# Patient Record
Sex: FEMALE | Race: WHITE | Employment: PART TIME
[De-identification: names, ages, dates, MRNs, and addresses within clinical notes are randomized per-mention and may not be internally consistent; named-entity substitution may affect disease eponyms.]

---
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
## 2018-02-21 ENCOUNTER — OFFICE VISIT (OUTPATIENT)
Dept: FAMILY MEDICINE CLINIC | Age: 18
End: 2018-02-21
Payer: COMMERCIAL

## 2018-02-21 VITALS
DIASTOLIC BLOOD PRESSURE: 64 MMHG | HEIGHT: 67 IN | WEIGHT: 133 LBS | BODY MASS INDEX: 20.88 KG/M2 | SYSTOLIC BLOOD PRESSURE: 102 MMHG

## 2018-02-21 DIAGNOSIS — G43.109 MIGRAINE WITH AURA AND WITHOUT STATUS MIGRAINOSUS, NOT INTRACTABLE: ICD-10-CM

## 2018-02-21 DIAGNOSIS — N94.4 PRIMARY DYSMENORRHEA: Primary | ICD-10-CM

## 2018-02-21 PROCEDURE — 99213 OFFICE O/P EST LOW 20 MIN: CPT | Performed by: FAMILY MEDICINE

## 2018-02-21 RX ORDER — ACETAMINOPHEN AND CODEINE PHOSPHATE 120; 12 MG/5ML; MG/5ML
1 SOLUTION ORAL DAILY
Qty: 90 TABLET | Refills: 1 | Status: SHIPPED | OUTPATIENT
Start: 2018-02-21 | End: 2018-03-28 | Stop reason: SDUPTHER

## 2018-02-21 ASSESSMENT — PATIENT HEALTH QUESTIONNAIRE - PHQ9
SUM OF ALL RESPONSES TO PHQ QUESTIONS 1-9: 0
1. LITTLE INTEREST OR PLEASURE IN DOING THINGS: 0
SUM OF ALL RESPONSES TO PHQ9 QUESTIONS 1 & 2: 0
2. FEELING DOWN, DEPRESSED OR HOPELESS: 0

## 2018-02-21 ASSESSMENT — ENCOUNTER SYMPTOMS: GASTROINTESTINAL NEGATIVE: 1

## 2018-03-27 ENCOUNTER — TELEPHONE (OUTPATIENT)
Dept: FAMILY MEDICINE CLINIC | Age: 18
End: 2018-03-27

## 2018-03-27 DIAGNOSIS — N94.4 PRIMARY DYSMENORRHEA: ICD-10-CM

## 2018-03-28 RX ORDER — NORETHINDRONE 0.35 MG/1
1 TABLET ORAL DAILY
Qty: 90 TABLET | Refills: 3 | OUTPATIENT
Start: 2018-03-28 | End: 2019-01-21 | Stop reason: SDUPTHER

## 2018-07-20 ENCOUNTER — OFFICE VISIT (OUTPATIENT)
Dept: FAMILY MEDICINE CLINIC | Age: 18
End: 2018-07-20
Payer: COMMERCIAL

## 2018-07-20 VITALS
SYSTOLIC BLOOD PRESSURE: 110 MMHG | BODY MASS INDEX: 21.66 KG/M2 | HEART RATE: 60 BPM | HEIGHT: 67 IN | DIASTOLIC BLOOD PRESSURE: 60 MMHG | WEIGHT: 138 LBS

## 2018-07-20 DIAGNOSIS — N64.4 BREAST TENDERNESS IN FEMALE: ICD-10-CM

## 2018-07-20 DIAGNOSIS — N94.4 PRIMARY DYSMENORRHEA: Primary | ICD-10-CM

## 2018-07-20 DIAGNOSIS — G43.109 MIGRAINE WITH AURA AND WITHOUT STATUS MIGRAINOSUS, NOT INTRACTABLE: ICD-10-CM

## 2018-07-20 PROCEDURE — 81025 URINE PREGNANCY TEST: CPT | Performed by: FAMILY MEDICINE

## 2018-07-20 PROCEDURE — 99214 OFFICE O/P EST MOD 30 MIN: CPT | Performed by: FAMILY MEDICINE

## 2018-07-20 ASSESSMENT — ENCOUNTER SYMPTOMS: GASTROINTESTINAL NEGATIVE: 1

## 2018-07-20 NOTE — PROGRESS NOTES
11/6/14  Yes Robbin Cobian, DO   SUMAtriptan (IMITREX) 50 MG tablet Take 1 tablet by mouth once as needed for Migraine 12/16/16 1/16/17  John Gold DO        Allergies:       Patient has no known allergies. Review of Systems:     Positive and Negative as described in HPI    Review of Systems   Constitutional: Negative. Gastrointestinal: Negative. Genitourinary: Negative for difficulty urinating. Physical Exam:     Vitals:  /60   Pulse 60   Ht 5' 7\" (1.702 m)   Wt 138 lb (62.6 kg)   LMP 07/01/2018   BMI 21.61 kg/m²   Physical Exam   Constitutional: She is oriented to person, place, and time. She appears well-developed and well-nourished. No distress. HENT:   Head: Normocephalic and atraumatic. Eyes: Conjunctivae and EOM are normal.   Cardiovascular: Normal rate, regular rhythm and normal heart sounds. No peripheral edema. Pulmonary/Chest: Effort normal and breath sounds normal.   Abdominal: Soft. Bowel sounds are normal. There is no tenderness. Neurological: She is alert and oriented to person, place, and time. Skin: Skin is warm and dry. Psychiatric: She has a normal mood and affect. Judgment normal.   Nursing note and vitals reviewed.       Data:     Lab Results   Component Value Date     12/16/2016    K 3.9 12/16/2016     12/16/2016    CO2 26 12/16/2016    BUN 12 12/16/2016    CREATININE 0.64 12/16/2016    GLUCOSE 97 12/16/2016    PROT 7.8 11/01/2013    LABALBU 4.4 11/01/2013    BILITOT 0.60 11/01/2013    ALKPHOS 115 11/01/2013    AST 22 11/01/2013    ALT 17 11/01/2013     Lab Results   Component Value Date    WBC 5.9 12/16/2016    RBC 4.81 12/16/2016    HGB 14.4 12/16/2016    HCT 42.4 12/16/2016    MCV 88.3 12/16/2016    MCH 30.0 12/16/2016    MCHC 33.9 12/16/2016    RDW 12.4 12/16/2016     12/16/2016    MPV NOT REPORTED 12/16/2016     Lab Results   Component Value Date    TSH 1.61 11/01/2013     Lab Results   Component Value Date    LABA1C 4.8

## 2018-07-22 PROBLEM — N94.4 PRIMARY DYSMENORRHEA: Status: ACTIVE | Noted: 2018-07-22

## 2018-07-22 PROBLEM — G43.109 MIGRAINE WITH AURA AND WITHOUT STATUS MIGRAINOSUS, NOT INTRACTABLE: Status: ACTIVE | Noted: 2018-07-22

## 2018-08-22 ENCOUNTER — OFFICE VISIT (OUTPATIENT)
Dept: PRIMARY CARE CLINIC | Age: 18
End: 2018-08-22
Payer: COMMERCIAL

## 2018-08-22 VITALS
BODY MASS INDEX: 20.99 KG/M2 | SYSTOLIC BLOOD PRESSURE: 122 MMHG | DIASTOLIC BLOOD PRESSURE: 75 MMHG | HEART RATE: 60 BPM | TEMPERATURE: 98.1 F | OXYGEN SATURATION: 99 % | WEIGHT: 134 LBS

## 2018-08-22 DIAGNOSIS — R10.9 FLANK PAIN: ICD-10-CM

## 2018-08-22 DIAGNOSIS — R35.0 URINARY FREQUENCY: ICD-10-CM

## 2018-08-22 DIAGNOSIS — N30.01 ACUTE CYSTITIS WITH HEMATURIA: Primary | ICD-10-CM

## 2018-08-22 LAB
BILIRUBIN, POC: ABNORMAL
BLOOD URINE, POC: ABNORMAL
CLARITY, POC: CLEAR
COLOR, POC: YELLOW
CONTROL: PRESENT
GLUCOSE URINE, POC: ABNORMAL
KETONES, POC: ABNORMAL
LEUKOCYTE EST, POC: ABNORMAL
NITRITE, POC: ABNORMAL
PH, POC: 5.5
PREGNANCY TEST URINE, POC: NEGATIVE
PROTEIN, POC: ABNORMAL
SPECIFIC GRAVITY, POC: 1.02
UROBILINOGEN, POC: 0.2

## 2018-08-22 PROCEDURE — 81025 URINE PREGNANCY TEST: CPT | Performed by: NURSE PRACTITIONER

## 2018-08-22 PROCEDURE — 99213 OFFICE O/P EST LOW 20 MIN: CPT | Performed by: NURSE PRACTITIONER

## 2018-08-22 PROCEDURE — 81003 URINALYSIS AUTO W/O SCOPE: CPT | Performed by: NURSE PRACTITIONER

## 2018-08-22 RX ORDER — NITROFURANTOIN 25; 75 MG/1; MG/1
100 CAPSULE ORAL 2 TIMES DAILY
Qty: 14 CAPSULE | Refills: 0 | Status: SHIPPED | OUTPATIENT
Start: 2018-08-22 | End: 2018-08-29

## 2018-08-22 ASSESSMENT — ENCOUNTER SYMPTOMS
WHEEZING: 0
COUGH: 0
NAUSEA: 0
SHORTNESS OF BREATH: 0
VOMITING: 0
DIARRHEA: 0
CONSTIPATION: 0

## 2018-08-22 NOTE — PATIENT INSTRUCTIONS
SURVEY:    You may be receiving a survey from [employer] regarding your visit today. Please complete the survey to enable us to provide the highest quality of care to you and your family. If you cannot score us as very good on any question, please call the office to discuss how we could have made your experience exceptional.     Thank you. Patient Education   Patient Education          nitrofurantoin  Pronunciation:  MAGALI BEAUCHAMP toin  Brand:  Furadantin, Macrobid, Macrodantin  What is the most important information I should know about nitrofurantoin? You should not take nitrofurantoin if you have severe kidney disease, urination problems, or a history of jaundice or liver problems caused by nitrofurantoin. Do not take nitrofurantoin if you are in the last 2 to 4 weeks of pregnancy. What is nitrofurantoin? Nitrofurantoin is an antibiotic that fights bacteria in the body. Nitrofurantoin is used to treat urinary tract infections. Nitrofurantoin may also be used for purposes not listed in this medication guide. What should I discuss with my healthcare provider before taking nitrofurantoin? You should not take nitrofurantoin if you are allergic to it, or if you have:  · severe kidney disease;  · a history of jaundice or liver problems caused by taking nitrofurantoin;  · if you are urinating less than usual or not at all; or  · if you are in the last 2 to 4 weeks of pregnancy. Do not take nitrofurantoin if you are in the last 2 to 4 weeks of pregnancy. To make sure nitrofurantoin is safe for you, tell your doctor if you have:  · kidney disease;  · anemia;  · diabetes;  · an electrolyte imbalance or vitamin B deficiency;  · glucose-6-phosphate dehydrogenase (G6PD) deficiency; or  · any type of debilitating disease. FDA pregnancy category B. This medicine is not expected to be harmful to an unborn baby during early pregnancy.  Tell your doctor if you are pregnant or plan to become pregnant during treatment. Nitrofurantoin can pass into breast milk and may harm a nursing baby. You should not breast-feed while you are taking nitrofurantoin. Nitrofurantoin should not be given to a child younger than 2 month old. How should I take nitrofurantoin? Follow all directions on your prescription label. Do not take this medicine in larger or smaller amounts or for longer than recommended. Take nitrofurantoin with food. Shake the oral suspension (liquid) well just before you measure a dose. Measure liquid medicine with the dosing syringe provided, or with a special dose-measuring spoon or medicine cup. If you do not have a dose-measuring device, ask your pharmacist for one. You may mix your liquid dose with water, milk, or fruit juice to make it easier to swallow. Drink the entire mixture right away. Use this medicine for the full prescribed length of time. Your symptoms may improve before the infection is completely cleared. Skipping doses may also increase your risk of further infection that is resistant to antibiotics. Nitrofurantoin will not treat a viral infection such as the common cold or flu. Nitrofurantoin is usually given for up to 3 days after lab tests show that the infection has cleared. If you use this medicine long-term, you may need frequent medical tests at your doctor's office. Nitrofurantoin can cause unusual results with certain lab tests for glucose (sugar) in the urine. Tell any doctor who treats you that you are using nitrofurantoin. Store at room temperature away from moisture, heat, and light. What happens if I miss a dose? Take the missed dose as soon as you remember. Skip the missed dose if it is almost time for your next scheduled dose. Do not  take extra medicine to make up the missed dose. What happens if I overdose? Seek emergency medical attention or call the Poison Help line at 1-919.931.4091. What should I avoid while taking nitrofurantoin?   Antibiotic medicines can using.  Where can I get more information? Your pharmacist can provide more information about nitrofurantoin. Remember, keep this and all other medicines out of the reach of children, never share your medicines with others, and use this medication only for the indication prescribed. Every effort has been made to ensure that the information provided by [address] is accurate, up-to-date, and complete, but no guarantee is made to that effect. Drug information contained herein may be time sensitive. [facility] information has been compiled for use by healthcare practitioners and consumers in the [place] and therefore [facility] does not warrant that uses outside of the [place] are appropriate, unless specifically indicated otherwise. [facility]'s drug information does not endorse drugs, diagnose patients or recommend therapy. [facility]'s drug information is an informational resource designed to assist licensed healthcare practitioners in caring for their patients and/or to serve consumers viewing this service as a supplement to, and not a substitute for, the expertise, skill, knowledge and judgment of healthcare practitioners. The absence of a warning for a given drug or drug combination in no way should be construed to indicate that the drug or drug combination is safe, effective or appropriate for any given patient. [facility] does not assume any responsibility for any aspect of healthcare administered with the aid of information [facility] provides. The information contained herein is not intended to cover all possible uses, directions, precautions, warnings, drug interactions, allergic reactions, or adverse effects. If you have questions about the drugs you are taking, check with your doctor, nurse or pharmacist.  Copyright 8563-4858 [address]. Version: 8.01. Revision date: 3/11/2014. Care instructions adapted under license by [facility] ([facility]).  If you have questions about a medical condition or this instruction, always ask your healthcare professional. [place] [address] any warranty or liability for your use of this information. Urinary Tract Infection in Women: Care Instructions  Your Care Instructions    A urinary tract infection, or UTI, is a general term for an infection anywhere between the kidneys and the urethra (where urine comes out). Most UTIs are bladder infections. They often cause pain or burning when you urinate. UTIs are caused by bacteria and can be cured with antibiotics. Be sure to complete your treatment so that the infection goes away. Follow-up care is a key part of your treatment and safety. Be sure to make and go to all appointments, and call your doctor if you are having problems. It's also a good idea to know your test results and keep a list of the medicines you take. How can you care for yourself at home? · Take your antibiotics as directed. Do not stop taking them just because you feel better. You need to take the full course of antibiotics. · Drink extra water and other fluids for the next day or two. This may help wash out the bacteria that are causing the infection. (If you have kidney, heart, or liver disease and have to limit fluids, talk with your doctor before you increase your fluid intake.)  · Avoid drinks that are carbonated or have caffeine. They can irritate the bladder. · Urinate often. Try to empty your bladder each time. · To relieve pain, take a hot bath or lay a heating pad set on low over your lower belly or genital area. Never go to sleep with a heating pad in place. To prevent UTIs  · Drink plenty of water each day. This helps you urinate often, which clears bacteria from your system. (If you have kidney, heart, or liver disease and have to limit fluids, talk with your doctor before you increase your fluid intake.)  · Urinate when you need to. · Urinate right after you have sex. · Change sanitary pads often.   · Avoid hives, rash, facial/tongue swelling or temp greater than 103 degrees. · Follow up with PCP or [facility] if symptoms worsen or do not improve.

## 2018-08-22 NOTE — PROGRESS NOTES
[address] [facility] WALK-IN [facility] [address] [id number]  Dept: [phone]  Dept Fax: [phone]    Petra Coronel is a 19 y.o. female who presents to the [facility] in Care today for her medical conditions/complaints as noted below. Petra Coronel is c/o of Urinary Tract Infection (Patient presents today for possible UTI since Sunday. Patient states she has had increased urinary frequency, EDGAR flank pain but denies dysuria. )      HPI:     Urinary Tract Infection    This is a new problem. The current episode started in the past 7 days (Started on Sunday with urinary frequency and B/L flank pain. ). The problem occurs every urination. The problem has been gradually worsening. The quality of the pain is described as stabbing (suprapubic area with urination). The pain is at a severity of 10/10. The pain is severe. There has been no fever. She is sexually active. There is no history of pyelonephritis. Associated symptoms include flank pain, frequency, hematuria (notice small blood clots when urinate) and urgency. Pertinent negatives include no chills, nausea or vomiting. She has tried nothing for the symptoms. The treatment provided no relief. There is no history of catheterization, kidney stones, recurrent UTIs, a single kidney, urinary stasis or a urological procedure.        Past Medical History:   Diagnosis Date    Allergic         Current Outpatient Prescriptions   Medication Sig Dispense Refill    nitrofurantoin, macrocrystal-monohydrate, (MACROBID) 100 MG capsule Take 1 capsule by mouth 2 times daily for 7 days 14 capsule 0    JOLIVETTE 0.35 MG tablet Take 1 tablet by mouth daily 90 tablet 3    fluticasone (FLONASE) 50 MCG/ACT nasal spray 2 sprays by Nasal route daily 1 Bottle 1    naproxen (EC-NAPROSYN) 500 MG EC tablet 1 po at onset of headaches BID prn 30 tablet 0    acetaminophen (TYLENOL) 325 MG tablet Take 650 mg by mouth every 6 hours as needed for of motion. Lymphadenopathy:     She has no cervical adenopathy. Right cervical: No superficial cervical and no posterior cervical adenopathy present. Left cervical: No superficial cervical and no posterior cervical adenopathy present. Neurological: She is alert and oriented to person, place, and time. Skin: Skin is warm and dry. No rash noted. She is not diaphoretic. No erythema. No pallor. Psychiatric: She has a normal mood and affect. Her behavior is normal.   Nursing note and vitals reviewed. /75   Pulse 60   Temp 98.1 °F (36.7 °C)   Wt 134 lb (60.8 kg)   LMP 08/15/2018   SpO2 99%   BMI 20.99 kg/m²      POCT urine: Moderate blood, small leukocytes and negative nitrites. POCT pregnancy:  Negative    Assessment:      Diagnosis Orders   1. Acute cystitis with hematuria  nitrofurantoin, macrocrystal-monohydrate, (MACROBID) 100 MG capsule   2. Flank pain  POCT Urinalysis No Micro (Auto)    POCT urine pregnancy   3. Urinary frequency  POCT Urinalysis No Micro (Auto)    POCT urine pregnancy       Plan:      Return if symptoms worsen or fail to improve, for Resume all previous medications as directed. Orders Placed This Encounter   Medications    nitrofurantoin, macrocrystal-monohydrate, (MACROBID) 100 MG capsule     Sig: Take 1 capsule by mouth 2 times daily for 7 days     Dispense:  14 capsule     Refill:  0      · Encouraged to increase fluids and to eat nutritiously. · Avoid full bladder, bubble baths, bath oils and hot tubs. · Wipe front to back and void after sexual intercourse. · Continue antibiotic as prescribed until all doses are completed  · Probiotic OTC or greek yogurt daily while on antibiotic  · After antibiotic is completed, have urine rechecked for blood. · Patient instructions given for urinary tract infection and macrobid.   · To ER or call 911 if any difficulty breathing, shortness of breath, inability to swallow, hives, rash, facial/tongue swelling or temp

## 2018-09-07 ENCOUNTER — OFFICE VISIT (OUTPATIENT)
Dept: PRIMARY CARE CLINIC | Age: 18
End: 2018-09-07
Payer: COMMERCIAL

## 2018-09-07 VITALS
WEIGHT: 137.1 LBS | BODY MASS INDEX: 21.52 KG/M2 | OXYGEN SATURATION: 99 % | HEART RATE: 71 BPM | HEIGHT: 67 IN | SYSTOLIC BLOOD PRESSURE: 122 MMHG | DIASTOLIC BLOOD PRESSURE: 79 MMHG

## 2018-09-07 DIAGNOSIS — R31.9 HEMATURIA, UNSPECIFIED TYPE: Primary | ICD-10-CM

## 2018-09-07 LAB
BILIRUBIN, POC: NEGATIVE
BLOOD URINE, POC: NEGATIVE
CLARITY, POC: CLEAR
COLOR, POC: NORMAL
GLUCOSE URINE, POC: NEGATIVE
KETONES, POC: NORMAL
LEUKOCYTE EST, POC: NEGATIVE
NITRITE, POC: NEGATIVE
PH, POC: 6
PROTEIN, POC: 30
SPECIFIC GRAVITY, POC: 1.02
UROBILINOGEN, POC: 0.2

## 2018-09-07 PROCEDURE — 81002 URINALYSIS NONAUTO W/O SCOPE: CPT | Performed by: NURSE PRACTITIONER

## 2018-09-07 PROCEDURE — 99211 OFF/OP EST MAY X REQ PHY/QHP: CPT | Performed by: NURSE PRACTITIONER

## 2018-09-21 ENCOUNTER — OFFICE VISIT (OUTPATIENT)
Dept: FAMILY MEDICINE CLINIC | Age: 18
End: 2018-09-21
Payer: COMMERCIAL

## 2018-09-21 VITALS — HEIGHT: 67 IN | WEIGHT: 135 LBS | BODY MASS INDEX: 21.19 KG/M2

## 2018-09-21 DIAGNOSIS — R21 RASH OF HANDS: Primary | ICD-10-CM

## 2018-09-21 PROCEDURE — 99213 OFFICE O/P EST LOW 20 MIN: CPT | Performed by: FAMILY MEDICINE

## 2018-09-21 RX ORDER — ALBUTEROL SULFATE 90 UG/1
2 AEROSOL, METERED RESPIRATORY (INHALATION) EVERY 6 HOURS PRN
Qty: 1 INHALER | Refills: 2 | Status: SHIPPED | OUTPATIENT
Start: 2018-09-21 | End: 2020-09-30

## 2018-09-21 ASSESSMENT — ENCOUNTER SYMPTOMS: RESPIRATORY NEGATIVE: 1

## 2018-09-21 NOTE — PATIENT INSTRUCTIONS
SURVEY:    You may be receiving a survey from [employer] regarding your visit today. Please complete the survey to enable us to provide the highest quality of care to you and your family. If you cannot score us as very good on any question, please call the office to discuss how we could have made your experience exceptional.     Thank you.

## 2019-01-21 ENCOUNTER — OFFICE VISIT (OUTPATIENT)
Dept: FAMILY MEDICINE CLINIC | Age: 19
End: 2019-01-21
Payer: COMMERCIAL

## 2019-01-21 VITALS
HEIGHT: 67 IN | HEART RATE: 75 BPM | SYSTOLIC BLOOD PRESSURE: 106 MMHG | DIASTOLIC BLOOD PRESSURE: 60 MMHG | WEIGHT: 145 LBS | BODY MASS INDEX: 22.76 KG/M2

## 2019-01-21 DIAGNOSIS — N94.4 PRIMARY DYSMENORRHEA: Primary | ICD-10-CM

## 2019-01-21 PROCEDURE — 99213 OFFICE O/P EST LOW 20 MIN: CPT | Performed by: FAMILY MEDICINE

## 2019-01-21 RX ORDER — NORETHINDRONE 0.35 MG/1
1 TABLET ORAL DAILY
Qty: 90 TABLET | Refills: 0 | Status: SHIPPED | OUTPATIENT
Start: 2019-01-21 | End: 2019-03-26 | Stop reason: SDUPTHER

## 2019-01-21 ASSESSMENT — PATIENT HEALTH QUESTIONNAIRE - PHQ9
2. FEELING DOWN, DEPRESSED OR HOPELESS: 0
1. LITTLE INTEREST OR PLEASURE IN DOING THINGS: 0
SUM OF ALL RESPONSES TO PHQ QUESTIONS 1-9: 0
SUM OF ALL RESPONSES TO PHQ QUESTIONS 1-9: 0
SUM OF ALL RESPONSES TO PHQ9 QUESTIONS 1 & 2: 0

## 2019-01-21 ASSESSMENT — ENCOUNTER SYMPTOMS: GASTROINTESTINAL NEGATIVE: 1

## 2019-03-26 DIAGNOSIS — N94.4 PRIMARY DYSMENORRHEA: ICD-10-CM

## 2019-03-26 RX ORDER — NORETHINDRONE 0.35 MG/1
1 TABLET ORAL DAILY
Qty: 84 TABLET | Refills: 3 | Status: SHIPPED | OUTPATIENT
Start: 2019-03-26 | End: 2019-05-21 | Stop reason: SDUPTHER

## 2019-05-21 DIAGNOSIS — N94.4 PRIMARY DYSMENORRHEA: ICD-10-CM

## 2019-05-21 RX ORDER — NORETHINDRONE 0.35 MG/1
1 TABLET ORAL DAILY
Qty: 84 TABLET | Refills: 3 | Status: SHIPPED | OUTPATIENT
Start: 2019-05-21 | End: 2019-11-21 | Stop reason: SDUPTHER

## 2019-06-06 ENCOUNTER — HOSPITAL ENCOUNTER (OUTPATIENT)
Age: 19
Discharge: HOME | End: 2019-06-06
Payer: COMMERCIAL

## 2019-06-06 ENCOUNTER — OFFICE VISIT (OUTPATIENT)
Dept: FAMILY MEDICINE CLINIC | Age: 19
End: 2019-06-06
Payer: COMMERCIAL

## 2019-06-06 VITALS
BODY MASS INDEX: 22.44 KG/M2 | HEART RATE: 71 BPM | SYSTOLIC BLOOD PRESSURE: 100 MMHG | OXYGEN SATURATION: 99 % | HEIGHT: 67 IN | WEIGHT: 143 LBS | DIASTOLIC BLOOD PRESSURE: 60 MMHG

## 2019-06-06 DIAGNOSIS — Z00.00 ROUTINE HEALTH MAINTENANCE: Primary | ICD-10-CM

## 2019-06-06 DIAGNOSIS — Z11.1 PPD SCREENING TEST: ICD-10-CM

## 2019-06-06 DIAGNOSIS — Z01.84 ANTIBODY RESPONSE EXAM: ICD-10-CM

## 2019-06-06 PROCEDURE — 99395 PREV VISIT EST AGE 18-39: CPT | Performed by: FAMILY MEDICINE

## 2019-06-06 PROCEDURE — 86580 TB INTRADERMAL TEST: CPT | Performed by: FAMILY MEDICINE

## 2019-06-06 PROCEDURE — 36415 COLL VENOUS BLD VENIPUNCTURE: CPT

## 2019-06-06 PROCEDURE — 86787 VARICELLA-ZOSTER ANTIBODY: CPT

## 2019-06-06 ASSESSMENT — PATIENT HEALTH QUESTIONNAIRE - PHQ9
SUM OF ALL RESPONSES TO PHQ QUESTIONS 1-9: 0
SUM OF ALL RESPONSES TO PHQ9 QUESTIONS 1 & 2: 0
SUM OF ALL RESPONSES TO PHQ QUESTIONS 1-9: 0
1. LITTLE INTEREST OR PLEASURE IN DOING THINGS: 0
2. FEELING DOWN, DEPRESSED OR HOPELESS: 0

## 2019-06-06 ASSESSMENT — ENCOUNTER SYMPTOMS
RESPIRATORY NEGATIVE: 1
GASTROINTESTINAL NEGATIVE: 1
EYES NEGATIVE: 1

## 2019-06-06 NOTE — PROGRESS NOTES
Name: Stan Brooks  : 2000         Chief Complaint:     Chief Complaint   Patient presents with    Annual Exam       History of Present Illness:      Stan Brooks is a 22 y.o.  female who presents with Annual Exam      HPI     Patient presents for routine health maintenance. Has no complaints except for some allergic rhinitis symptoms recently. 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 Continuing to work at [employer] for the summer. Past Medical History:     Past Medical History:   Diagnosis Date    Allergic         Past Surgical History:     No past surgical history on file. Medications:       Prior to Admission medications    Medication Sig Start Date End Date Taking? Authorizing Provider   JOLIVETTE 0.35 MG tablet Take 1 tablet by mouth daily 19  Yes Annamary Many, DO   albuterol sulfate HFA (VENTOLIN HFA) 108 (90 Base) MCG/ACT inhaler Inhale 2 puffs into the lungs every 6 hours as needed for Wheezing or Shortness of Breath 18  Yes Annamary Many, DO   acetaminophen (TYLENOL) 325 MG tablet Take 650 mg by mouth every 6 hours as needed for Pain   Yes Historical Provider, MD        Allergies:       Patient has no known allergies. Social History:     Tobacco:    reports that she has never smoked. She has never used smokeless tobacco.  Alcohol:      reports that she does not drink alcohol. Drug Use:  reports that she does not use drugs. Family History:     No family history on file. Review of Systems:     Positive and Negative as described in HPI    Review of Systems   Constitutional: Negative. HENT: Negative. Eyes: Negative. Respiratory: Negative. Cardiovascular: Negative. Gastrointestinal: Negative. Genitourinary: Negative. Musculoskeletal: Negative. Skin: Negative. Neurological: Negative. Hematological: Negative. Psychiatric/Behavioral: Negative.         Physical Exam: Vitals:  /60   Pulse 71   Ht 5' 7\" (1.702 m)   Wt 143 lb (64.9 kg)   SpO2 99%   BMI 22.40 kg/m²   Physical Exam   Constitutional: She is oriented to person, place, and time. She appears well-developed and well-nourished. HENT:   Head: Normocephalic and atraumatic. Right Ear: Hearing and tympanic membrane normal.   Left Ear: Hearing and tympanic membrane normal.   Nose: No mucosal edema or rhinorrhea. Mouth/Throat: Oropharynx is clear and moist.   Eyes: Pupils are equal, round, and reactive to light. Conjunctivae and EOM are normal.   Neck: Neck supple. No thyroid mass and no thyromegaly present. Cardiovascular: Normal rate, regular rhythm, S1 normal and S2 normal.   No murmur heard. No peripheral edema. Pulmonary/Chest: Effort normal and breath sounds normal.   Abdominal: Soft. Bowel sounds are normal. There is no hepatosplenomegaly. There is no tenderness. Musculoskeletal:   Normal tone and bulk, normal gait. Lymphadenopathy:     She has no cervical adenopathy. Neurological: She is alert and oriented to person, place, and time. She has normal strength. Skin: Skin is warm and dry. No rash noted. Psychiatric: She has a normal mood and affect. Her behavior is normal.   Nursing note and vitals reviewed.       Data:     Lab Results   Component Value Date     12/16/2016    K 3.9 12/16/2016     12/16/2016    CO2 26 12/16/2016    BUN 12 12/16/2016    CREATININE 0.64 12/16/2016    GLUCOSE 97 12/16/2016    PROT 7.8 11/01/2013    LABALBU 4.4 11/01/2013    BILITOT 0.60 11/01/2013    ALKPHOS 115 11/01/2013    AST 22 11/01/2013    ALT 17 11/01/2013     Lab Results   Component Value Date    WBC 5.9 12/16/2016    RBC 4.81 12/16/2016    HGB 14.4 12/16/2016    HCT 42.4 12/16/2016    MCV 88.3 12/16/2016    MCH 30.0 12/16/2016    MCHC 33.9 12/16/2016    RDW 12.4 12/16/2016     12/16/2016    MPV NOT REPORTED 12/16/2016     Lab Results   Component Value Date    TSH 1.61 11/01/2013 Lab Results   Component Value Date    LABA1C 4.8 12/16/2016         Assessment & Plan:        Diagnosis Orders   1. Routine health maintenance     2. PPD screening test  Mantoux testing   3. Antibody response exam  Varicella Zoster Antibody, IgG   history and exam within normal limits. Can have her first Pap at any time now. 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        Requested Prescriptions      No prescriptions requested or ordered in this encounter       There are no Patient Instructions on file for this visit. [facility] received counseling on the following healthy behaviors: medication adherence  Reviewed prior labs and health maintenance. Continue current medications, diet and exercise. Discussed use, benefit, and side effects of prescribed medications. Barriers to medication compliance addressed. Patient given educational materials - see patient instructions. All patient questions answered. Patient voiced understanding.      Electronically signed by Augustin Ahn DO on 6/6/2019 at 11:28 AM   [address] [address]  Dept: [phone]

## 2019-06-07 LAB — VZV IGG SER QL IA: 1.24

## 2019-06-08 LAB
INDURATION: NORMAL
TB SKIN TEST: NORMAL

## 2019-06-24 ENCOUNTER — NURSE ONLY (OUTPATIENT)
Dept: FAMILY MEDICINE CLINIC | Age: 19
End: 2019-06-24
Payer: COMMERCIAL

## 2019-06-24 DIAGNOSIS — Z11.1 PPD SCREENING TEST: Primary | ICD-10-CM

## 2019-06-24 PROCEDURE — 86580 TB INTRADERMAL TEST: CPT | Performed by: FAMILY MEDICINE

## 2019-06-24 NOTE — PROGRESS NOTES
After obtaining consent, and per orders of Dr. Nhan Eng, injection of mantoux given in Right arm by Bertrum Bath. Patient instructed to remain in clinic for 20 minutes afterwards, and to report any adverse reaction to me immediately.

## 2019-06-26 ENCOUNTER — NURSE ONLY (OUTPATIENT)
Dept: FAMILY MEDICINE CLINIC | Age: 19
End: 2019-06-26

## 2019-06-26 DIAGNOSIS — Z11.1 ENCOUNTER FOR PPD SKIN TEST READING: Primary | ICD-10-CM

## 2019-06-26 LAB
INDURATION: NORMAL
TB SKIN TEST: NEGATIVE

## 2019-06-26 NOTE — PROGRESS NOTES
PPD Reading Note  PPD read and results entered in [address]. Result: 0 mm induration.   Interpretation: negative  If test not read within 48-72 hours of initial placement, patient advised to repeat in other arm 1-3 weeks after this test.  Allergic reaction: no

## 2019-07-15 ENCOUNTER — HOSPITAL ENCOUNTER (OUTPATIENT)
Age: 19
Setting detail: SPECIMEN
Discharge: HOME | End: 2019-07-15
Payer: COMMERCIAL

## 2019-07-15 ENCOUNTER — OFFICE VISIT (OUTPATIENT)
Dept: FAMILY MEDICINE CLINIC | Age: 19
End: 2019-07-15
Payer: COMMERCIAL

## 2019-07-15 VITALS
BODY MASS INDEX: 22.91 KG/M2 | DIASTOLIC BLOOD PRESSURE: 80 MMHG | SYSTOLIC BLOOD PRESSURE: 110 MMHG | WEIGHT: 146 LBS | HEIGHT: 67 IN

## 2019-07-15 DIAGNOSIS — Z12.4 CERVICAL CANCER SCREENING: ICD-10-CM

## 2019-07-15 DIAGNOSIS — N89.8 VAGINAL DISCHARGE: ICD-10-CM

## 2019-07-15 DIAGNOSIS — Z01.419 WELL WOMAN EXAM WITH ROUTINE GYNECOLOGICAL EXAM: Primary | ICD-10-CM

## 2019-07-15 PROCEDURE — G0145 SCR C/V CYTO,THINLAYER,RESCR: HCPCS

## 2019-07-15 PROCEDURE — 87491 CHLMYD TRACH DNA AMP PROBE: CPT

## 2019-07-15 PROCEDURE — 87591 N.GONORRHOEAE DNA AMP PROB: CPT

## 2019-07-15 PROCEDURE — 99395 PREV VISIT EST AGE 18-39: CPT | Performed by: FAMILY MEDICINE

## 2019-07-15 ASSESSMENT — ENCOUNTER SYMPTOMS
RESPIRATORY NEGATIVE: 1
GASTROINTESTINAL NEGATIVE: 1

## 2019-07-15 NOTE — PROGRESS NOTES
5' 7\" (1.702 m)   Wt 146 lb (66.2 kg)   LMP 07/04/2019   BMI 22.87 kg/m²   Physical Exam   Constitutional: She is oriented to person, place, and time. She appears well-developed and well-nourished. No distress. Pulmonary/Chest: Effort normal. Right breast exhibits no mass and no skin change. Left breast exhibits no mass and no skin change. Genitourinary: Uterus normal. There is no lesion on the right labia. There is no lesion on the left labia. Cervix exhibits no motion tenderness and no friability. Right adnexum displays no mass and no tenderness. Left adnexum displays no mass and no tenderness. No erythema or bleeding in the vagina. Vaginal discharge (white with some cheesy character) found. Neurological: She is alert and oriented to person, place, and time. Skin: Skin is warm and dry. Psychiatric: She has a normal mood and affect. Judgment normal.   Nursing note and vitals reviewed. Data:     Lab Results   Component Value Date     12/16/2016    K 3.9 12/16/2016     12/16/2016    CO2 26 12/16/2016    BUN 12 12/16/2016    CREATININE 0.64 12/16/2016    GLUCOSE 97 12/16/2016    PROT 7.8 11/01/2013    LABALBU 4.4 11/01/2013    BILITOT 0.60 11/01/2013    ALKPHOS 115 11/01/2013    AST 22 11/01/2013    ALT 17 11/01/2013     Lab Results   Component Value Date    WBC 5.9 12/16/2016    RBC 4.81 12/16/2016    HGB 14.4 12/16/2016    HCT 42.4 12/16/2016    MCV 88.3 12/16/2016    MCH 30.0 12/16/2016    MCHC 33.9 12/16/2016    RDW 12.4 12/16/2016     12/16/2016    MPV NOT REPORTED 12/16/2016     Lab Results   Component Value Date    TSH 1.61 11/01/2013     Lab Results   Component Value Date    LABA1C 4.8 12/16/2016         Assessment & Plan:        Diagnosis Orders   1. Well woman exam with routine gynecological exam     2. Cervical cancer screening  PAP Smear   3. Vaginal discharge  Chlamydia/GC DNA, Thin Prep   PAP completed.  Normal exam. gc chlamydia for discharge and routine screening at her age. Cont current dosing of norethindrone. Requested Prescriptions      No prescriptions requested or ordered in this encounter       Patient Instructions     SURVEY:    You may be receiving a survey from [employer] regarding your visit today. Please complete the survey to enable us to provide the highest quality of care to you and your family. If you cannot score us as very good on any question, please call the office to discuss how we could have made your experience exceptional.     Thank you. [facility] received counseling on the following healthy behaviors: medication adherence  Reviewed prior labs and health maintenance. Continue current medications, diet and exercise. Discussed use, benefit, and side effects of prescribed medications. Barriers to medication compliance addressed. Patient given educational materials - see patient instructions. All patient questions answered. Patient voiced understanding.      Electronically signed by Lashawn Hilario DO on 7/15/2019 at 11:08 PM   [address]  Dept: [phone]

## 2019-07-15 NOTE — PATIENT INSTRUCTIONS
SURVEY:    You may be receiving a survey from [employer] regarding your visit today. Please complete the survey to enable us to provide the highest quality of care to you and your family. If you cannot score us as very good on any question, please call the office to discuss how we could have made your experience exceptional.     Thank you.

## 2019-07-16 LAB
CHLAMYDIA BY THIN PREP: NEGATIVE
N. GONORRHOEAE DNA, THIN PREP: NEGATIVE
SPECIMEN DESCRIPTION: NORMAL

## 2019-07-18 LAB — CYTOLOGY REPORT: NORMAL

## 2019-09-17 ENCOUNTER — OFFICE VISIT (OUTPATIENT)
Dept: FAMILY MEDICINE CLINIC | Age: 19
End: 2019-09-17
Payer: COMMERCIAL

## 2019-09-17 VITALS
SYSTOLIC BLOOD PRESSURE: 120 MMHG | WEIGHT: 147 LBS | HEART RATE: 87 BPM | HEIGHT: 67 IN | DIASTOLIC BLOOD PRESSURE: 64 MMHG | OXYGEN SATURATION: 100 % | BODY MASS INDEX: 23.07 KG/M2

## 2019-09-17 DIAGNOSIS — G47.09 OTHER INSOMNIA: ICD-10-CM

## 2019-09-17 DIAGNOSIS — F41.1 GAD (GENERALIZED ANXIETY DISORDER): Primary | ICD-10-CM

## 2019-09-17 DIAGNOSIS — R40.0 DAYTIME SLEEPINESS: ICD-10-CM

## 2019-09-17 PROCEDURE — 99214 OFFICE O/P EST MOD 30 MIN: CPT | Performed by: FAMILY MEDICINE

## 2019-09-17 RX ORDER — HYDROXYZINE HYDROCHLORIDE 25 MG/1
25 TABLET, FILM COATED ORAL EVERY 8 HOURS PRN
Qty: 30 TABLET | Refills: 0 | Status: SHIPPED | OUTPATIENT
Start: 2019-09-17 | End: 2020-08-31 | Stop reason: SDUPTHER

## 2019-09-17 RX ORDER — CITALOPRAM 20 MG/1
20 TABLET ORAL DAILY
Qty: 30 TABLET | Refills: 1 | Status: SHIPPED | OUTPATIENT
Start: 2019-09-17 | End: 2020-03-20 | Stop reason: SDUPTHER

## 2019-09-17 RX ORDER — PANTOPRAZOLE SODIUM 40 MG/1
TABLET, DELAYED RELEASE ORAL
Refills: 0 | COMMUNITY
Start: 2019-09-05 | End: 2020-08-31

## 2019-09-17 ASSESSMENT — ENCOUNTER SYMPTOMS: GASTROINTESTINAL NEGATIVE: 1

## 2019-11-05 ENCOUNTER — OFFICE VISIT (OUTPATIENT)
Dept: FAMILY MEDICINE CLINIC | Age: 19
End: 2019-11-05
Payer: COMMERCIAL

## 2019-11-05 ENCOUNTER — HOSPITAL ENCOUNTER (OUTPATIENT)
Age: 19
Discharge: HOME | End: 2019-11-05
Payer: COMMERCIAL

## 2019-11-05 VITALS
DIASTOLIC BLOOD PRESSURE: 60 MMHG | HEART RATE: 71 BPM | SYSTOLIC BLOOD PRESSURE: 110 MMHG | BODY MASS INDEX: 23.39 KG/M2 | WEIGHT: 149 LBS | HEIGHT: 67 IN | OXYGEN SATURATION: 99 %

## 2019-11-05 DIAGNOSIS — R40.0 DAYTIME SLEEPINESS: ICD-10-CM

## 2019-11-05 DIAGNOSIS — G93.32 CHRONIC FATIGUE SYNDROME: ICD-10-CM

## 2019-11-05 DIAGNOSIS — R53.82 CHRONIC FATIGUE: Primary | ICD-10-CM

## 2019-11-05 DIAGNOSIS — F41.1 GAD (GENERALIZED ANXIETY DISORDER): ICD-10-CM

## 2019-11-05 DIAGNOSIS — R53.82 CHRONIC FATIGUE: ICD-10-CM

## 2019-11-05 LAB
ABSOLUTE EOS #: 0.2 K/UL (ref 0–0.4)
ABSOLUTE IMMATURE GRANULOCYTE: ABNORMAL K/UL (ref 0–0.3)
ABSOLUTE LYMPH #: 1.5 K/UL (ref 1–4.8)
ABSOLUTE MONO #: 0.5 K/UL (ref 0–1)
BASOPHILS # BLD: 1 % (ref 0–2)
BASOPHILS ABSOLUTE: 0 K/UL (ref 0–0.2)
DIFFERENTIAL TYPE: YES
EOSINOPHILS RELATIVE PERCENT: 3 % (ref 0–5)
HCT VFR BLD CALC: 45.6 % (ref 36–46)
HEMOGLOBIN: 15.4 G/DL (ref 12–16)
IMMATURE GRANULOCYTES: ABNORMAL %
LYMPHOCYTES # BLD: 25 % (ref 15–40)
MCH RBC QN AUTO: 28.4 PG (ref 26–34)
MCHC RBC AUTO-ENTMCNC: 33.7 G/DL (ref 31–37)
MCV RBC AUTO: 84.4 FL (ref 80–100)
MONOCYTES # BLD: 8 % (ref 4–8)
NRBC AUTOMATED: ABNORMAL PER 100 WBC
PDW BLD-RTO: 13.5 % (ref 12.1–15.2)
PLATELET # BLD: 220 K/UL (ref 140–450)
PLATELET ESTIMATE: ABNORMAL
PMV BLD AUTO: ABNORMAL FL (ref 6–12)
RBC # BLD: 5.41 M/UL (ref 4–5.2)
RBC # BLD: ABNORMAL 10*6/UL
SEG NEUTROPHILS: 63 % (ref 47–75)
SEGMENTED NEUTROPHILS ABSOLUTE COUNT: 3.7 K/UL (ref 2.5–7)
TSH SERPL DL<=0.05 MIU/L-ACNC: 2.48 MIU/L (ref 0.3–5)
VITAMIN B-12: 775 PG/ML (ref 232–1245)
VITAMIN D 25-HYDROXY: 23.7 NG/ML (ref 30–100)
WBC # BLD: 5.9 K/UL (ref 4.5–13.5)
WBC # BLD: ABNORMAL 10*3/UL

## 2019-11-05 PROCEDURE — 82306 VITAMIN D 25 HYDROXY: CPT

## 2019-11-05 PROCEDURE — 85025 COMPLETE CBC W/AUTO DIFF WBC: CPT

## 2019-11-05 PROCEDURE — 99214 OFFICE O/P EST MOD 30 MIN: CPT | Performed by: FAMILY MEDICINE

## 2019-11-05 PROCEDURE — 36415 COLL VENOUS BLD VENIPUNCTURE: CPT

## 2019-11-05 PROCEDURE — 82607 VITAMIN B-12: CPT

## 2019-11-05 PROCEDURE — 84443 ASSAY THYROID STIM HORMONE: CPT

## 2019-11-05 RX ORDER — DEXTROAMPHETAMINE SACCHARATE, AMPHETAMINE ASPARTATE MONOHYDRATE, DEXTROAMPHETAMINE SULFATE AND AMPHETAMINE SULFATE 2.5; 2.5; 2.5; 2.5 MG/1; MG/1; MG/1; MG/1
10 CAPSULE, EXTENDED RELEASE ORAL DAILY
Qty: 30 CAPSULE | Refills: 0 | Status: SHIPPED | OUTPATIENT
Start: 2019-11-05 | End: 2020-08-10

## 2019-11-05 ASSESSMENT — ENCOUNTER SYMPTOMS
GASTROINTESTINAL NEGATIVE: 1
RESPIRATORY NEGATIVE: 1

## 2019-11-06 ENCOUNTER — TELEPHONE (OUTPATIENT)
Dept: FAMILY MEDICINE CLINIC | Age: 19
End: 2019-11-06

## 2019-11-08 ENCOUNTER — TELEPHONE (OUTPATIENT)
Dept: FAMILY MEDICINE CLINIC | Age: 19
End: 2019-11-08

## 2019-11-08 DIAGNOSIS — R40.0 DAYTIME SLEEPINESS: ICD-10-CM

## 2019-11-08 DIAGNOSIS — R53.82 CHRONIC FATIGUE: Primary | ICD-10-CM

## 2019-11-08 RX ORDER — METHYLPHENIDATE HYDROCHLORIDE 10 MG/1
10 TABLET ORAL 2 TIMES DAILY
Qty: 60 TABLET | Refills: 0 | Status: SHIPPED | OUTPATIENT
Start: 2019-11-08 | End: 2019-12-08

## 2019-11-21 DIAGNOSIS — N94.4 PRIMARY DYSMENORRHEA: ICD-10-CM

## 2019-11-21 RX ORDER — NORETHINDRONE 0.35 MG/1
1 TABLET ORAL DAILY
Qty: 84 TABLET | Refills: 3 | Status: SHIPPED | OUTPATIENT
Start: 2019-11-21 | End: 2020-05-21

## 2020-03-20 ENCOUNTER — OFFICE VISIT (OUTPATIENT)
Dept: FAMILY MEDICINE CLINIC | Age: 20
End: 2020-03-20
Payer: COMMERCIAL

## 2020-03-20 VITALS
OXYGEN SATURATION: 99 % | HEIGHT: 67 IN | DIASTOLIC BLOOD PRESSURE: 62 MMHG | BODY MASS INDEX: 24.01 KG/M2 | HEART RATE: 68 BPM | WEIGHT: 153 LBS | SYSTOLIC BLOOD PRESSURE: 100 MMHG

## 2020-03-20 PROCEDURE — 99214 OFFICE O/P EST MOD 30 MIN: CPT | Performed by: FAMILY MEDICINE

## 2020-03-20 RX ORDER — CITALOPRAM 20 MG/1
20 TABLET ORAL DAILY
Qty: 90 TABLET | Refills: 1 | Status: SHIPPED | OUTPATIENT
Start: 2020-03-20 | End: 2020-03-23

## 2020-03-20 ASSESSMENT — PATIENT HEALTH QUESTIONNAIRE - PHQ9
1. LITTLE INTEREST OR PLEASURE IN DOING THINGS: 0
SUM OF ALL RESPONSES TO PHQ QUESTIONS 1-9: 0
2. FEELING DOWN, DEPRESSED OR HOPELESS: 0
SUM OF ALL RESPONSES TO PHQ9 QUESTIONS 1 & 2: 0
SUM OF ALL RESPONSES TO PHQ QUESTIONS 1-9: 0

## 2020-03-20 ASSESSMENT — ENCOUNTER SYMPTOMS: RESPIRATORY NEGATIVE: 1

## 2020-03-20 NOTE — PATIENT INSTRUCTIONS
SURVEY:    You may be receiving a survey from [employer] regarding your visit today. You may get this in the mail, through your MyChart or in your email. Please complete the survey to enable us to provide the highest quality of care to you and your family. If you cannot score us as very good ( 5 Stars) on any question, please feel free to call the office to discuss how we could have made your experience exceptional.     Thank you.     Clinical Care Team:  Dr. Rachel Hsu,                                            [facility], [address] [place] [address] Team:  [address] [place] [address]

## 2020-03-20 NOTE — PROGRESS NOTES
Name: Jessie Leyva  : 2000         Chief Complaint:     Chief Complaint   Patient presents with    Anorexia     had 24 hour stomach bug 6 days ago, doesn't feel that she is eating as much as she used to. History of Present Illness:      Jessie Leyva is a 22 y.o.  female who presents with Anorexia (had 24 hour stomach bug 6 days ago, doesn't feel that she is eating as much as she used to. )      HPI     Patient presents complaining of poor appetite and recent gastroenteritis. 3/14 pt had had diarrhea all day. Very dehydrated and couldn't even walk, was taken to ER by family early in the morning of 3/15. Got IV fluids, fentanyl, zofran. Only 1 episode of diarrhea after returning from ER. Quite a bit better now but still not eating well. Only had one regular meal yesterday, dinner in the evening, and didn't even eat a whole lot of that. Drinking fluids well. Past few days has eaten mainly little things like snacks. No BM since the diarrhea 3/15. No tx tried for that. Given zofran rx in ER but did not fill. Patient's boyfriend and also her mother developed the same illness. Ongoing anxiety and depression. Feels like she is starting to get almost as bad as she was her senior year of high school, when she actually had some suicidal ideation. Patient does not think she has been taking Celexa very much. Social situation very stressful. She still lives with her parents, is in school and works at [employer]. Her parents recently took physical custody of her sisters children, oldest of whom is in  youngest is 10 months old. Patient describes her sister as a pathological liar and very dramatic so patient tries to avoid her but is not always able to. Dealings with her stress her out a lot. She still intermittently has times of being very tired during the day but not as bad as it had been previously.   Seems more circumstantial.  She was unable to obtain any stimulant medication because of insurance restrictions. Past Medical History:     Past Medical History:   Diagnosis Date    Allergic         Past Surgical History:     No past surgical history on file. Medications:       Prior to Admission medications    Medication Sig Start Date End Date Taking? Authorizing Provider   citalopram (CELEXA) 20 MG tablet Take 1 tablet by mouth daily 3/20/20  Yes Ingrid Doss DO   JOLIVETTE 0.35 MG tablet Take 1 tablet by mouth daily 11/21/19   Ingrid Doss DO   amphetamine-dextroamphetamine (ADDERALL XR) 10 MG extended release capsule Take 1 capsule by mouth daily for 30 days. 11/5/19 12/5/19  Ingrid Doss DO   pantoprazole (PROTONIX) 40 MG tablet TAKE 1 TABLET BY MOUTH DAILY 9/5/19   Historical Provider, MD   hydrOXYzine (ATARAX) 25 MG tablet Take 1 tablet by mouth every 8 hours as needed for Anxiety 9/17/19   Ingrid Doss DO   albuterol sulfate HFA (VENTOLIN HFA) 108 (90 Base) MCG/ACT inhaler Inhale 2 puffs into the lungs every 6 hours as needed for Wheezing or Shortness of Breath 9/21/18   Ingrid Doss DO   acetaminophen (TYLENOL) 325 MG tablet Take 650 mg by mouth every 6 hours as needed for Pain    Historical Provider, MD        Allergies:       Patient has no known allergies. Social History:     Tobacco:    reports that she has never smoked. She has never used smokeless tobacco.  Alcohol:      reports no history of alcohol use. Drug Use:  reports no history of drug use. Family History:     No family history on file. Review of Systems:     Positive and Negative as described in HPI    Review of Systems   Constitutional: Negative. HENT: Negative. Respiratory: Negative. Physical Exam:     Vitals:  /62   Pulse 68   Ht 5' 7\" (1.702 m)   Wt 153 lb (69.4 kg)   SpO2 99%   BMI 23.96 kg/m²   Physical Exam  Vitals signs and nursing note reviewed. Constitutional:       Appearance: Normal appearance. She is well-developed.    HENT: Head: Normocephalic and atraumatic. Right Ear: Hearing, tympanic membrane and ear canal normal.      Left Ear: Hearing, tympanic membrane and ear canal normal.      Nose: Nose normal. No mucosal edema or rhinorrhea. Mouth/Throat:      Mouth: Mucous membranes are moist.      Pharynx: Oropharynx is clear. Eyes:      Conjunctiva/sclera: Conjunctivae normal.      Pupils: Pupils are equal, round, and reactive to light. Neck:      Musculoskeletal: Neck supple. Thyroid: No thyroid mass or thyromegaly. Cardiovascular:      Rate and Rhythm: Normal rate and regular rhythm. Heart sounds: S1 normal and S2 normal. No murmur. Comments: No peripheral edema. Pulmonary:      Effort: Pulmonary effort is normal.      Breath sounds: Normal breath sounds. Abdominal:      General: Bowel sounds are normal.      Palpations: Abdomen is soft. Tenderness: There is no abdominal tenderness. Musculoskeletal:      Comments: Normal tone and bulk, normal gait. Lymphadenopathy:      Cervical: No cervical adenopathy. Skin:     General: Skin is warm and dry. Findings: No rash. Neurological:      Mental Status: She is alert and oriented to person, place, and time.    Psychiatric:         Mood and Affect: Mood normal.         Behavior: Behavior normal.         Judgment: Judgment normal.         Data:     Lab Results   Component Value Date     12/16/2016    K 3.9 12/16/2016     12/16/2016    CO2 26 12/16/2016    BUN 12 12/16/2016    CREATININE 0.64 12/16/2016    GLUCOSE 97 12/16/2016    PROT 7.8 11/01/2013    LABALBU 4.4 11/01/2013    BILITOT 0.60 11/01/2013    ALKPHOS 115 11/01/2013    AST 22 11/01/2013    ALT 17 11/01/2013     Lab Results   Component Value Date    WBC 5.9 11/05/2019    RBC 5.41 11/05/2019    HGB 15.4 11/05/2019    HCT 45.6 11/05/2019    MCV 84.4 11/05/2019    MCH 28.4 11/05/2019    MCHC 33.7 11/05/2019    RDW 13.5 11/05/2019     11/05/2019    MPV NOT REPORTED 11/05/2019     Lab Results   Component Value Date    TSH 2.48 11/05/2019     Lab Results   Component Value Date    LABA1C 4.8 12/16/2016         Assessment & Plan:        Diagnosis Orders   1. Slow transit constipation     2. Poor appetite     3. FERNANDO (generalized anxiety disorder)     4. Stress due to family tension     Constipation related to recent viral gastroenteritis, fentanyl and Zofran given at that time, low oral intake throughout this week. Advised to gradually increase oral intake including some fiber sources, continue good fluid intake, and take 1 dose of milk of magnesia. Discussed process of sometimes delayed gastric emptying shortly after a viral illness like this and possible ileus also. Had a benign abdominal exam and positive bowel sounds. Reviewed ER records incl labs. Ongoing anxiety. I suspect patient has hardly taken Celexa at all, as I had prescribed 30 with 1 refill in September. Advised of the need to take it every single day, take along with her birth control which she has been compliant with taking every evening. Consider counseling. Discussed ways of handling stress. Requested Prescriptions     Signed Prescriptions Disp Refills    citalopram (CELEXA) 20 MG tablet 90 tablet 1     Sig: Take 1 tablet by mouth daily       Patient Instructions   SURVEY:    You may be receiving a survey from [employer] regarding your visit today. You may get this in the mail, through your MyChart or in your email. Please complete the survey to enable us to provide the highest quality of care to you and your family. If you cannot score us as very good ( 5 Stars) on any question, please feel free to call the office to discuss how we could have made your experience exceptional.     Thank you.     Clinical Care Team:  DO Villa Paez, [address] [place] [address] Team:  [address]

## 2020-05-21 RX ORDER — NORETHINDRONE 0.35 MG/1
1 TABLET ORAL DAILY
Qty: 84 TABLET | Refills: 3 | Status: SHIPPED | OUTPATIENT
Start: 2020-05-21 | End: 2020-07-24 | Stop reason: SDUPTHER

## 2020-07-23 ENCOUNTER — PATIENT MESSAGE (OUTPATIENT)
Dept: FAMILY MEDICINE CLINIC | Age: 20
End: 2020-07-23

## 2020-07-24 ENCOUNTER — OFFICE VISIT (OUTPATIENT)
Dept: FAMILY MEDICINE CLINIC | Age: 20
End: 2020-07-24
Payer: COMMERCIAL

## 2020-07-24 VITALS
OXYGEN SATURATION: 99 % | WEIGHT: 158 LBS | HEART RATE: 57 BPM | BODY MASS INDEX: 24.8 KG/M2 | DIASTOLIC BLOOD PRESSURE: 70 MMHG | SYSTOLIC BLOOD PRESSURE: 110 MMHG | HEIGHT: 67 IN

## 2020-07-24 PROCEDURE — 99213 OFFICE O/P EST LOW 20 MIN: CPT | Performed by: FAMILY MEDICINE

## 2020-07-24 RX ORDER — NORETHINDRONE 0.35 MG/1
1 TABLET ORAL DAILY
Qty: 84 TABLET | Refills: 3 | Status: SHIPPED | OUTPATIENT
Start: 2020-07-24 | End: 2020-08-10

## 2020-07-24 RX ORDER — SULFAMETHOXAZOLE AND TRIMETHOPRIM 800; 160 MG/1; MG/1
1 TABLET ORAL 2 TIMES DAILY
Qty: 20 TABLET | Refills: 0 | Status: SHIPPED | OUTPATIENT
Start: 2020-07-24 | End: 2020-08-03

## 2020-07-24 NOTE — PROGRESS NOTES
Name: Lupe Streeter  : 2000         Chief Complaint:     Chief Complaint   Patient presents with    Mass     lumps under her bilateral axilla and left breast. get red and painful . started 2 weeks ago       History of Present Illness:      Lupe Streeter is a 23 y.o.  female who presents with Mass (lumps under her bilateral axilla and left breast. get red and painful . started 2 weeks ago)      HPI     Patient complains of skin lesions of bilateral axillae for the past couple of weeks. At times they become more red, sore, then do typically resolve. None of them have drained. A few days ago she also noticed a sore spot and lump in the left breast.  This area does not have any overlying redness or other skin change. No treatment tried. She remains on progestin only oral contraceptive and is starting to have heavy menses again. Patient has gone through some changes which have been stressful but overall positive, no longer living with her family and now living with her boyfriend and his family. Rehomed her puppy. Past Medical History:     Past Medical History:   Diagnosis Date    Allergic         Past Surgical History:     No past surgical history on file. Medications:       Prior to Admission medications    Medication Sig Start Date End Date Taking? Authorizing Provider   JOLIVETTE 0.35 MG tablet Take 1 tablet by mouth daily 20  Yes Mi Talbert DO   sulfamethoxazole-trimethoprim (BACTRIM DS;SEPTRA DS) 800-160 MG per tablet Take 1 tablet by mouth 2 times daily for 10 days 7/24/20 8/3/20 Yes Mi Talbert DO   citalopram (CELEXA) 20 MG tablet take 1 tablet by mouth once daily 3/23/20   Mi Talbert DO   amphetamine-dextroamphetamine (ADDERALL XR) 10 MG extended release capsule Take 1 capsule by mouth daily for 30 days.  19  Mi Talbert DO   pantoprazole (PROTONIX) 40 MG tablet TAKE 1 TABLET BY MOUTH DAILY 19   Historical Provider, MD hydrOXYzine (ATARAX) 25 MG tablet Take 1 tablet by mouth every 8 hours as needed for Anxiety 9/17/19   Gill Canelo, DO   albuterol sulfate HFA (VENTOLIN HFA) 108 (90 Base) MCG/ACT inhaler Inhale 2 puffs into the lungs every 6 hours as needed for Wheezing or Shortness of Breath 9/21/18   Gill Canelo, DO   acetaminophen (TYLENOL) 325 MG tablet Take 650 mg by mouth every 6 hours as needed for Pain    Historical Provider, MD        Allergies:       Patient has no known allergies. Social History:     Tobacco:    reports that she has never smoked. She has never used smokeless tobacco.  Alcohol:      reports no history of alcohol use. Drug Use:  reports no history of drug use. Family History:     No family history on file. Review of Systems:     Positive and Negative as described in HPI    Review of Systems   Constitutional: Negative. Negative for chills and fever. Musculoskeletal: Negative. Neurological: Negative. Physical Exam:     Vitals:  /70   Pulse 57   Ht 5' 7\" (1.702 m)   Wt 158 lb (71.7 kg)   SpO2 99%   BMI 24.75 kg/m²   Physical Exam  Vitals signs and nursing note reviewed. Constitutional:       General: She is not in acute distress. Appearance: Normal appearance. She is well-developed. She is not ill-appearing. Pulmonary:      Effort: Pulmonary effort is normal.   Chest:       Skin:     General: Skin is warm and dry. Comments: Right axilla: A few scattered erythematous papules, no fluctuance, no pustules present, no palpable lymphadenopathy. Similar on the left but fewer lesions. Neurological:      Mental Status: She is alert and oriented to person, place, and time.    Psychiatric:         Mood and Affect: Mood normal.         Behavior: Behavior normal.         Judgment: Judgment normal.         Data:     Lab Results   Component Value Date     12/16/2016    K 3.9 12/16/2016     12/16/2016    CO2 26 12/16/2016    BUN 12 12/16/2016 CREATININE 0.64 12/16/2016    GLUCOSE 97 12/16/2016    PROT 7.8 11/01/2013    LABALBU 4.4 11/01/2013    BILITOT 0.60 11/01/2013    ALKPHOS 115 11/01/2013    AST 22 11/01/2013    ALT 17 11/01/2013     Lab Results   Component Value Date    WBC 5.9 11/05/2019    RBC 5.41 11/05/2019    HGB 15.4 11/05/2019    HCT 45.6 11/05/2019    MCV 84.4 11/05/2019    MCH 28.4 11/05/2019    MCHC 33.7 11/05/2019    RDW 13.5 11/05/2019     11/05/2019    MPV NOT REPORTED 11/05/2019     Lab Results   Component Value Date    TSH 2.48 11/05/2019     Lab Results   Component Value Date    LABA1C 4.8 12/16/2016         Assessment & Plan:        Diagnosis Orders   1. Skin infection     2. Breast lump     Low-grade skin infection of bilateral axillae, with papules that come and go, nothing purulent, large, or indurated. Treating with antibiotic. Uncertain whether the left breast small lump is related to the axillary issue or not. Advised to monitor it over the next couple of weeks as she is on the antibiotics. Advised if it has not decreased in size significantly by 8/10, call and I would order breast ultrasound. Requested Prescriptions     Signed Prescriptions Disp Refills    JOLIVETTE 0.35 MG tablet 84 tablet 3     Sig: Take 1 tablet by mouth daily    sulfamethoxazole-trimethoprim (BACTRIM DS;SEPTRA DS) 800-160 MG per tablet 20 tablet 0     Sig: Take 1 tablet by mouth 2 times daily for 10 days       There are no Patient Instructions on file for this visit. [facility] received counseling on the following healthy behaviors: medication adherence  Reviewed prior labs and health maintenance. Continue current medications, diet and exercise. Discussed use, benefit, and side effects of prescribed medications. Barriers to medication compliance addressed. Patient given educational materials - see patient instructions. All patient questions answered. Patient voiced understanding.      Electronically signed by Elvis Passer,

## 2020-08-10 ENCOUNTER — TELEPHONE (OUTPATIENT)
Dept: FAMILY MEDICINE CLINIC | Age: 20
End: 2020-08-10

## 2020-08-10 RX ORDER — ACETAMINOPHEN AND CODEINE PHOSPHATE 120; 12 MG/5ML; MG/5ML
1 SOLUTION ORAL DAILY
Qty: 28 TABLET | Refills: 2 | Status: SHIPPED | OUTPATIENT
Start: 2020-08-10 | End: 2021-06-28

## 2020-08-31 ENCOUNTER — OFFICE VISIT (OUTPATIENT)
Dept: FAMILY MEDICINE CLINIC | Age: 20
End: 2020-08-31
Payer: COMMERCIAL

## 2020-08-31 VITALS
TEMPERATURE: 98.1 F | RESPIRATION RATE: 16 BRPM | SYSTOLIC BLOOD PRESSURE: 120 MMHG | OXYGEN SATURATION: 99 % | BODY MASS INDEX: 25.58 KG/M2 | HEIGHT: 67 IN | WEIGHT: 163 LBS | DIASTOLIC BLOOD PRESSURE: 60 MMHG | HEART RATE: 71 BPM

## 2020-08-31 PROCEDURE — 99395 PREV VISIT EST AGE 18-39: CPT | Performed by: FAMILY MEDICINE

## 2020-08-31 PROCEDURE — 86580 TB INTRADERMAL TEST: CPT | Performed by: FAMILY MEDICINE

## 2020-08-31 RX ORDER — HYDROXYZINE HYDROCHLORIDE 25 MG/1
25 TABLET, FILM COATED ORAL EVERY 8 HOURS PRN
Qty: 30 TABLET | Refills: 0 | Status: SHIPPED | OUTPATIENT
Start: 2020-08-31 | End: 2020-09-30 | Stop reason: SDUPTHER

## 2020-08-31 RX ORDER — PANTOPRAZOLE SODIUM 40 MG/1
TABLET, DELAYED RELEASE ORAL
Qty: 90 TABLET | Refills: 1 | Status: CANCELLED | OUTPATIENT
Start: 2020-08-31

## 2020-08-31 RX ORDER — BUPROPION HYDROCHLORIDE 150 MG/1
150 TABLET ORAL EVERY MORNING
Qty: 30 TABLET | Refills: 3 | Status: SHIPPED | OUTPATIENT
Start: 2020-08-31 | End: 2020-12-31 | Stop reason: SDUPTHER

## 2020-08-31 RX ORDER — HYDROXYZINE HYDROCHLORIDE 25 MG/1
25 TABLET, FILM COATED ORAL EVERY 8 HOURS PRN
Qty: 30 TABLET | Refills: 0 | Status: CANCELLED | OUTPATIENT
Start: 2020-08-31

## 2020-08-31 RX ORDER — CITALOPRAM 20 MG/1
TABLET ORAL
Qty: 90 TABLET | Refills: 1 | Status: CANCELLED | OUTPATIENT
Start: 2020-08-31

## 2020-08-31 NOTE — PROGRESS NOTES
Name: Jayesh Nixon  : 2000         Chief Complaint:     Chief Complaint   Patient presents with    Annual Exam       History of Present Illness:      Jayesh Nixon is a 23 y.o.  female who presents with Annual Exam      HPI     Patient presents for well visit, work physical.  Also has several complaints as in ROS. Past Medical History:     Past Medical History:   Diagnosis Date    Allergic         Past Surgical History:     No past surgical history on file. Medications:       Prior to Admission medications    Medication Sig Start Date End Date Taking? Authorizing Provider   buPROPion (WELLBUTRIN XL) 150 MG extended release tablet Take 1 tablet by mouth every morning 20  Yes Daniel Walsh, DO   hydrOXYzine (ATARAX) 25 MG tablet Take 1 tablet by mouth every 8 hours as needed for Anxiety 20  Yes Daniel Walsh, DO   norethindrone (ORTHO MICRONOR) 0.35 MG tablet Take 1 tablet by mouth daily SAMARA \"Sabine\" please 8/10/20   Daniel Walsh DO   albuterol sulfate HFA (VENTOLIN HFA) 108 (90 Base) MCG/ACT inhaler Inhale 2 puffs into the lungs every 6 hours as needed for Wheezing or Shortness of Breath 18   Daniel Walsh,    acetaminophen (TYLENOL) 325 MG tablet Take 650 mg by mouth every 6 hours as needed for Pain    Historical Provider, MD        Allergies:       Patient has no known allergies. Social History:     Tobacco:    reports that she has never smoked. She has never used smokeless tobacco.  Alcohol:      reports no history of alcohol use. Drug Use:  reports no history of drug use. Family History:     No family history on file. Review of Systems:     Positive and Negative as described in HPI    Review of Systems   Constitutional: Negative. HENT: Negative. Skin:        Bumps in bilateral axillae resolved with antibiotic but then recurred and are sore, sometimes even limiting her activities because of pain with moving the arms. Psychiatric/Behavioral: Positive for dysphoric mood (Persistent depression not helped by Celexa. In a way she feels like it made things worse at times. Sounds as though she is been confused which med was for depression, which was for anxiety as needed, and which was supposedly to help her fall asleep.  ) and sleep disturbance (difficulty falling asleep). Physical Exam:     Vitals:  /60   Pulse 71   Temp 98.1 °F (36.7 °C)   Resp 16   Ht 5' 7\" (1.702 m)   Wt 163 lb (73.9 kg)   SpO2 99%   BMI 25.53 kg/m²   Physical Exam  Vitals signs and nursing note reviewed. Constitutional:       Appearance: She is well-developed. She is not ill-appearing. HENT:      Head: Normocephalic and atraumatic. Right Ear: Hearing, tympanic membrane and ear canal normal.      Left Ear: Hearing, tympanic membrane and ear canal normal.      Nose: Nose normal. No mucosal edema or rhinorrhea. Mouth/Throat:      Mouth: Mucous membranes are moist.      Pharynx: Oropharynx is clear. Eyes:      Conjunctiva/sclera: Conjunctivae normal.      Pupils: Pupils are equal, round, and reactive to light. Neck:      Musculoskeletal: Neck supple. Thyroid: No thyroid mass or thyromegaly. Cardiovascular:      Rate and Rhythm: Normal rate and regular rhythm. Heart sounds: S1 normal and S2 normal. No murmur. Comments: No peripheral edema. Pulmonary:      Effort: Pulmonary effort is normal.      Breath sounds: Normal breath sounds. Abdominal:      General: Bowel sounds are normal.      Palpations: Abdomen is soft. Tenderness: There is no abdominal tenderness. Musculoskeletal:      Comments: Normal tone and bulk, normal gait. Lymphadenopathy:      Cervical: No cervical adenopathy. Skin:     General: Skin is warm and dry. Findings: No rash.       Comments: Small, tender subcutaneous nodule palpable in the left axilla   Neurological:      Mental Status: She is alert and oriented to person, place, (ATARAX) 25 MG tablet 30 tablet 0     Sig: Take 1 tablet by mouth every 8 hours as needed for Anxiety       There are no Patient Instructions on file for this visit. [facility] received counseling on the following healthy behaviors: medication adherence  Reviewed prior labs and health maintenance. Continue current medications, diet and exercise. Discussed use, benefit, and side effects of prescribed medications. Barriers to medication compliance addressed. Patient given educational materials - see patient instructions. All patient questions answered. Patient voiced understanding.      Electronically signed by Jan Vieira DO on 9/2/2020 at 12:38 PM   [address]  Dept: [phone]

## 2020-09-02 ENCOUNTER — HOSPITAL ENCOUNTER (OUTPATIENT)
Age: 20
Discharge: HOME | End: 2020-09-02
Payer: COMMERCIAL

## 2020-09-02 ENCOUNTER — NURSE ONLY (OUTPATIENT)
Dept: FAMILY MEDICINE CLINIC | Age: 20
End: 2020-09-02

## 2020-09-02 LAB
ABSOLUTE EOS #: 0.2 K/UL (ref 0–0.4)
ABSOLUTE IMMATURE GRANULOCYTE: ABNORMAL K/UL (ref 0–0.3)
ABSOLUTE LYMPH #: 1.5 K/UL (ref 1–4.8)
ABSOLUTE MONO #: 0.5 K/UL (ref 0–1)
ALBUMIN SERPL-MCNC: 4.7 G/DL (ref 3.5–5.2)
ALBUMIN/GLOBULIN RATIO: ABNORMAL (ref 1–2.5)
ALP BLD-CCNC: 130 U/L (ref 35–104)
ALT SERPL-CCNC: 34 U/L (ref 5–33)
ANION GAP SERPL CALCULATED.3IONS-SCNC: 9 MMOL/L (ref 9–17)
AST SERPL-CCNC: 25 U/L
BASOPHILS # BLD: 1 % (ref 0–2)
BASOPHILS ABSOLUTE: 0 K/UL (ref 0–0.2)
BILIRUB SERPL-MCNC: 0.5 MG/DL (ref 0.3–1.2)
BUN BLDV-MCNC: 9 MG/DL (ref 6–20)
BUN/CREAT BLD: 12 (ref 9–20)
CALCIUM SERPL-MCNC: 9.7 MG/DL (ref 8.6–10.4)
CHLORIDE BLD-SCNC: 103 MMOL/L (ref 98–107)
CO2: 26 MMOL/L (ref 20–31)
CREAT SERPL-MCNC: 0.75 MG/DL (ref 0.5–0.9)
DIFFERENTIAL TYPE: YES
EOSINOPHILS RELATIVE PERCENT: 2 % (ref 0–5)
GFR AFRICAN AMERICAN: >60 ML/MIN
GFR NON-AFRICAN AMERICAN: >60 ML/MIN
GFR SERPL CREATININE-BSD FRML MDRD: ABNORMAL ML/MIN/{1.73_M2}
GFR SERPL CREATININE-BSD FRML MDRD: ABNORMAL ML/MIN/{1.73_M2}
GLUCOSE BLD-MCNC: 102 MG/DL (ref 70–99)
HCT VFR BLD CALC: 44.8 % (ref 36–46)
HEMOGLOBIN: 15.2 G/DL (ref 12–16)
IMMATURE GRANULOCYTES: ABNORMAL %
INDURATION: NORMAL
LYMPHOCYTES # BLD: 21 % (ref 15–40)
MCH RBC QN AUTO: 28.9 PG (ref 26–34)
MCHC RBC AUTO-ENTMCNC: 33.8 G/DL (ref 31–37)
MCV RBC AUTO: 85.5 FL (ref 80–100)
MONOCYTES # BLD: 7 % (ref 4–8)
NRBC AUTOMATED: ABNORMAL PER 100 WBC
PDW BLD-RTO: 13.4 % (ref 12.1–15.2)
PLATELET # BLD: 244 K/UL (ref 140–450)
PLATELET ESTIMATE: ABNORMAL
PMV BLD AUTO: ABNORMAL FL (ref 6–12)
POTASSIUM SERPL-SCNC: 3.8 MMOL/L (ref 3.7–5.3)
RBC # BLD: 5.24 M/UL (ref 4–5.2)
RBC # BLD: ABNORMAL 10*6/UL
RUBV IGG SER QL: 26 IU/ML
SEG NEUTROPHILS: 69 % (ref 47–75)
SEGMENTED NEUTROPHILS ABSOLUTE COUNT: 5.1 K/UL (ref 2.5–7)
SODIUM BLD-SCNC: 138 MMOL/L (ref 135–144)
TB SKIN TEST: NORMAL
TOTAL PROTEIN: 9.7 G/DL (ref 6.4–8.3)
WBC # BLD: 7.3 K/UL (ref 3.5–11)
WBC # BLD: ABNORMAL 10*3/UL

## 2020-09-02 PROCEDURE — 85025 COMPLETE CBC W/AUTO DIFF WBC: CPT

## 2020-09-02 PROCEDURE — 36415 COLL VENOUS BLD VENIPUNCTURE: CPT

## 2020-09-02 PROCEDURE — 86762 RUBELLA ANTIBODY: CPT

## 2020-09-02 PROCEDURE — 80053 COMPREHEN METABOLIC PANEL: CPT

## 2020-09-03 ENCOUNTER — TELEPHONE (OUTPATIENT)
Dept: FAMILY MEDICINE CLINIC | Age: 20
End: 2020-09-03

## 2020-09-03 NOTE — TELEPHONE ENCOUNTER
----- Message from Gricelda Casanova DO sent at 9/3/2020 10:37 AM EDT -----  Immune to rubella. This was done for her school physical.  Otherwise labs looked okay. Minimal elevation of a couple of her liver numbers which is likely not significant but we can recheck hepatic panel in a month. Please make sure she is not drinking alcohol or taking much Tylenol.

## 2020-09-08 ENCOUNTER — NURSE ONLY (OUTPATIENT)
Dept: FAMILY MEDICINE CLINIC | Age: 20
End: 2020-09-08
Payer: COMMERCIAL

## 2020-09-08 PROCEDURE — 86580 TB INTRADERMAL TEST: CPT | Performed by: FAMILY MEDICINE

## 2020-09-08 NOTE — PROGRESS NOTES
Tuberculin skin test applied to Right ventral forearm. Explained how to read the test, measuring induration not just erythema; she will come into office on thurday for reading.

## 2020-09-10 ENCOUNTER — NURSE ONLY (OUTPATIENT)
Dept: FAMILY MEDICINE CLINIC | Age: 20
End: 2020-09-10

## 2020-09-30 ENCOUNTER — OFFICE VISIT (OUTPATIENT)
Dept: FAMILY MEDICINE CLINIC | Age: 20
End: 2020-09-30
Payer: COMMERCIAL

## 2020-09-30 VITALS
WEIGHT: 164 LBS | DIASTOLIC BLOOD PRESSURE: 60 MMHG | OXYGEN SATURATION: 99 % | HEART RATE: 63 BPM | BODY MASS INDEX: 25.74 KG/M2 | HEIGHT: 67 IN | SYSTOLIC BLOOD PRESSURE: 100 MMHG

## 2020-09-30 PROCEDURE — G8427 DOCREV CUR MEDS BY ELIG CLIN: HCPCS | Performed by: FAMILY MEDICINE

## 2020-09-30 PROCEDURE — 99213 OFFICE O/P EST LOW 20 MIN: CPT | Performed by: FAMILY MEDICINE

## 2020-09-30 PROCEDURE — G8419 CALC BMI OUT NRM PARAM NOF/U: HCPCS | Performed by: FAMILY MEDICINE

## 2020-09-30 PROCEDURE — 1036F TOBACCO NON-USER: CPT | Performed by: FAMILY MEDICINE

## 2020-09-30 RX ORDER — HYDROXYZINE HYDROCHLORIDE 25 MG/1
25 TABLET, FILM COATED ORAL EVERY 8 HOURS PRN
Qty: 60 TABLET | Refills: 2 | Status: SHIPPED | OUTPATIENT
Start: 2020-09-30 | End: 2020-12-31 | Stop reason: SDUPTHER

## 2020-09-30 NOTE — PROGRESS NOTES
Name: Crystal Brown  : 2000         Chief Complaint:     Chief Complaint   Patient presents with    Anxiety       History of Present Illness:      Crystal Brown is a 23 y.o.  female who presents with Anxiety      HPI     F/u depression. Feels the wellbutrin helps with mood and motivation. Also using hydroxyzine, tends to take 1 in the morning prior to going to work or clinicals and it helps her not to get as stressed with what she's doing. Sleeping ok at night except for right now d/t menstrual cramps. Dysmenorrhea - sometimes cramps start before her bleeding. Yesterday cramping started and then her bleeding started today. Menses regular. On progestin-only contraceptive. Sometimes uses ibuprofen which helps a little. Past Medical History:     Past Medical History:   Diagnosis Date    Allergic         Past Surgical History:     No past surgical history on file. Medications:       Prior to Admission medications    Medication Sig Start Date End Date Taking? Authorizing Provider   hydrOXYzine (ATARAX) 25 MG tablet Take 1 tablet by mouth every 8 hours as needed for Anxiety 20  Yes Chyrl Rocio, DO   buPROPion (WELLBUTRIN XL) 150 MG extended release tablet Take 1 tablet by mouth every morning 20  Yes Chyrl Rocio, DO   norethindrone (ORTHO MICRONOR) 0.35 MG tablet Take 1 tablet by mouth daily SAMARA \"Sabine\" please 8/10/20  Yes Chyrl Rocio, DO   acetaminophen (TYLENOL) 325 MG tablet Take 650 mg by mouth every 6 hours as needed for Pain   Yes Historical Provider, MD        Allergies:       Seasonal    Social History:     Tobacco:    reports that she has never smoked. She has never used smokeless tobacco.  Alcohol:      reports no history of alcohol use. Drug Use:  reports no history of drug use. Family History:     No family history on file. Review of Systems:     Positive and Negative as described in HPI    Review of Systems   Constitutional: Negative. Respiratory: Negative. Gastrointestinal: Negative. Physical Exam:     Vitals:  /60   Pulse 63   Ht 5' 7\" (1.702 m)   Wt 164 lb (74.4 kg)   SpO2 99%   BMI 25.69 kg/m²   Physical Exam  Vitals signs and nursing note reviewed. Constitutional:       General: She is not in acute distress. Appearance: Normal appearance. She is well-developed. She is not ill-appearing. Pulmonary:      Effort: Pulmonary effort is normal.   Skin:     General: Skin is warm and dry. Neurological:      Mental Status: She is alert and oriented to person, place, and time. Psychiatric:         Mood and Affect: Mood normal.         Behavior: Behavior normal.         Judgment: Judgment normal.      Comments: Calm and pleasant         Data:     Lab Results   Component Value Date     09/02/2020    K 3.8 09/02/2020     09/02/2020    CO2 26 09/02/2020    BUN 9 09/02/2020    CREATININE 0.75 09/02/2020    GLUCOSE 102 09/02/2020    PROT 9.7 09/02/2020    LABALBU 4.7 09/02/2020    BILITOT 0.50 09/02/2020    ALKPHOS 130 09/02/2020    AST 25 09/02/2020    ALT 34 09/02/2020     Lab Results   Component Value Date    WBC 7.3 09/02/2020    RBC 5.24 09/02/2020    HGB 15.2 09/02/2020    HCT 44.8 09/02/2020    MCV 85.5 09/02/2020    MCH 28.9 09/02/2020    MCHC 33.8 09/02/2020    RDW 13.4 09/02/2020     09/02/2020    MPV NOT REPORTED 09/02/2020     Lab Results   Component Value Date    TSH 2.48 11/05/2019     Lab Results   Component Value Date    LABA1C 4.8 12/16/2016         Assessment & Plan:        Diagnosis Orders   1. Primary dysmenorrhea     2. Depression with anxiety     dysmenorrhea still bothersome on progestin-only pill, which pt has to take instead of combined OCP d/t migraine with aura. Discussed again other options incl mirena or nexplanon, for which she would need to see gyn, or depo shot.  Advised using routine nsaid's starting as soon as bleeding starts, or at onset of cramping if the cramping starts first. May also use heat including topical patches. Depression & anxiety controlled on wellbutrin with hydroxyzine prn. Continue same. Requested Prescriptions     Signed Prescriptions Disp Refills    hydrOXYzine (ATARAX) 25 MG tablet 60 tablet 2     Sig: Take 1 tablet by mouth every 8 hours as needed for Anxiety       There are no Patient Instructions on file for this visit. [facility] received counseling on the following healthy behaviors: medication adherence  Reviewed prior labs and health maintenance. Continue current medications, diet and exercise. Discussed use, benefit, and side effects of prescribed medications. Barriers to medication compliance addressed. Patient given educational materials - see patient instructions. All patient questions answered. Patient voiced understanding.      Electronically signed by Omega Degroot DO on 10/3/2020 at 9:30 AM   [address]  Dept: [phone]

## 2020-10-03 ASSESSMENT — ENCOUNTER SYMPTOMS
RESPIRATORY NEGATIVE: 1
GASTROINTESTINAL NEGATIVE: 1

## 2020-12-04 ENCOUNTER — OFFICE VISIT (OUTPATIENT)
Dept: FAMILY MEDICINE CLINIC | Age: 20
End: 2020-12-04
Payer: COMMERCIAL

## 2020-12-04 VITALS
HEIGHT: 67 IN | SYSTOLIC BLOOD PRESSURE: 120 MMHG | DIASTOLIC BLOOD PRESSURE: 62 MMHG | WEIGHT: 171 LBS | BODY MASS INDEX: 26.84 KG/M2

## 2020-12-04 PROCEDURE — G8427 DOCREV CUR MEDS BY ELIG CLIN: HCPCS | Performed by: FAMILY MEDICINE

## 2020-12-04 PROCEDURE — 99213 OFFICE O/P EST LOW 20 MIN: CPT | Performed by: FAMILY MEDICINE

## 2020-12-04 PROCEDURE — 1036F TOBACCO NON-USER: CPT | Performed by: FAMILY MEDICINE

## 2020-12-04 PROCEDURE — G8482 FLU IMMUNIZE ORDER/ADMIN: HCPCS | Performed by: FAMILY MEDICINE

## 2020-12-04 PROCEDURE — G8419 CALC BMI OUT NRM PARAM NOF/U: HCPCS | Performed by: FAMILY MEDICINE

## 2020-12-04 SDOH — ECONOMIC STABILITY: FOOD INSECURITY: WITHIN THE PAST 12 MONTHS, YOU WORRIED THAT YOUR FOOD WOULD RUN OUT BEFORE YOU GOT MONEY TO BUY MORE.: NEVER TRUE

## 2020-12-04 SDOH — ECONOMIC STABILITY: INCOME INSECURITY: HOW HARD IS IT FOR YOU TO PAY FOR THE VERY BASICS LIKE FOOD, HOUSING, MEDICAL CARE, AND HEATING?: NOT HARD AT ALL

## 2020-12-04 SDOH — ECONOMIC STABILITY: FOOD INSECURITY: WITHIN THE PAST 12 MONTHS, THE FOOD YOU BOUGHT JUST DIDN'T LAST AND YOU DIDN'T HAVE MONEY TO GET MORE.: NEVER TRUE

## 2020-12-04 NOTE — PROGRESS NOTES
Systems:     Positive and Negative as described in HPI    Review of Systems   Constitutional: Negative. Respiratory: Negative. Neurological: Negative. Physical Exam:     Vitals:  /62   Ht 5' 7\" (1.702 m)   Wt 171 lb (77.6 kg)   BMI 26.78 kg/m²   Physical Exam  Vitals signs and nursing note reviewed. Constitutional:       General: She is not in acute distress. Appearance: Normal appearance. She is well-developed. She is not ill-appearing. Pulmonary:      Effort: Pulmonary effort is normal.   Skin:     General: Skin is warm and dry. Comments: R breast just superolateral to areola: pink blanchable hyperpigmented area approx 1 cm diameter, no induration or fluctuance, tender  Photos on phone show multiple similar areas on grace breasts, much more pronounced erythema present   Neurological:      Mental Status: She is alert and oriented to person, place, and time. Psychiatric:         Judgment: Judgment normal.         Data:     Lab Results   Component Value Date     09/02/2020    K 3.8 09/02/2020     09/02/2020    CO2 26 09/02/2020    BUN 9 09/02/2020    CREATININE 0.75 09/02/2020    GLUCOSE 102 09/02/2020    PROT 9.7 09/02/2020    LABALBU 4.7 09/02/2020    BILITOT 0.50 09/02/2020    ALKPHOS 130 09/02/2020    AST 25 09/02/2020    ALT 34 09/02/2020     Lab Results   Component Value Date    WBC 7.3 09/02/2020    RBC 5.24 09/02/2020    HGB 15.2 09/02/2020    HCT 44.8 09/02/2020    MCV 85.5 09/02/2020    MCH 28.9 09/02/2020    MCHC 33.8 09/02/2020    RDW 13.4 09/02/2020     09/02/2020    MPV NOT REPORTED 09/02/2020     Lab Results   Component Value Date    TSH 2.48 11/05/2019     Lab Results   Component Value Date    LABA1C 4.8 12/16/2016         Assessment & Plan:        Diagnosis Orders   1. Painful skin lesion  External Referral To Dermatology   painful lesions grace breasts which come and go, uncertain etiology.  Some of these may have improved with use of keflex over

## 2020-12-07 ASSESSMENT — ENCOUNTER SYMPTOMS: RESPIRATORY NEGATIVE: 1

## 2020-12-31 ENCOUNTER — HOSPITAL ENCOUNTER (OUTPATIENT)
Dept: ULTRASOUND IMAGING | Age: 20
Discharge: HOME | End: 2021-01-02
Payer: COMMERCIAL

## 2020-12-31 ENCOUNTER — OFFICE VISIT (OUTPATIENT)
Dept: FAMILY MEDICINE CLINIC | Age: 20
End: 2020-12-31
Payer: COMMERCIAL

## 2020-12-31 VITALS
WEIGHT: 174 LBS | BODY MASS INDEX: 27.31 KG/M2 | DIASTOLIC BLOOD PRESSURE: 60 MMHG | HEART RATE: 67 BPM | OXYGEN SATURATION: 99 % | SYSTOLIC BLOOD PRESSURE: 100 MMHG | HEIGHT: 67 IN

## 2020-12-31 DIAGNOSIS — F33.41 RECURRENT MAJOR DEPRESSIVE DISORDER, IN PARTIAL REMISSION (HCC): ICD-10-CM

## 2020-12-31 DIAGNOSIS — M79.621 PAIN IN RIGHT AXILLA: ICD-10-CM

## 2020-12-31 DIAGNOSIS — N64.4 BREAST PAIN: Primary | ICD-10-CM

## 2020-12-31 DIAGNOSIS — N64.4 BREAST PAIN: ICD-10-CM

## 2020-12-31 PROCEDURE — 99213 OFFICE O/P EST LOW 20 MIN: CPT | Performed by: FAMILY MEDICINE

## 2020-12-31 PROCEDURE — 76641 ULTRASOUND BREAST COMPLETE: CPT

## 2020-12-31 RX ORDER — BUPROPION HYDROCHLORIDE 150 MG/1
150 TABLET ORAL DAILY
Qty: 30 TABLET | Refills: 5 | Status: SHIPPED | OUTPATIENT
Start: 2020-12-31 | End: 2022-01-18

## 2020-12-31 RX ORDER — HYDROXYZINE HYDROCHLORIDE 25 MG/1
25 TABLET, FILM COATED ORAL EVERY 8 HOURS PRN
Qty: 60 TABLET | Refills: 2 | Status: SHIPPED | OUTPATIENT
Start: 2020-12-31

## 2020-12-31 NOTE — PROGRESS NOTES
Name: Buck Mariscal  : 2000         Chief Complaint:     Chief Complaint   Patient presents with    Anxiety       History of Present Illness:      Buck Mariscal is a 23 y.o.  female who presents with Anxiety      HPI     Depression had been doing well but now bad again, finds she's dreading going back to clinicals after having been off for 2 wks. Some difficulty remembering med so she hasn't always taken it daily. Wakes up at different times in the morning. Takes norethindrone at night which is easy because she's always home at the same time. Painful lesions on breasts and axillae still come and go, really bad in R axilla right now, the worst it's been. Uses aleve which helps a little with discomfort but not with resolution of lesions. Past Medical History:     Past Medical History:   Diagnosis Date    Allergic         Past Surgical History:     No past surgical history on file. Medications:       Prior to Admission medications    Medication Sig Start Date End Date Taking? Authorizing Provider   buPROPion (WELLBUTRIN XL) 150 MG extended release tablet Take 1 tablet by mouth daily 20  Yes Ankur Harrell DO   hydrOXYzine (ATARAX) 25 MG tablet Take 1 tablet by mouth every 8 hours as needed for Anxiety 20  Yes Ankur Harrell DO   norethindrone (ORTHO MICRONOR) 0.35 MG tablet Take 1 tablet by mouth daily SAMARA \"Sabine\" please 8/10/20  Yes Ankur Harrell DO   acetaminophen (TYLENOL) 325 MG tablet Take 650 mg by mouth every 6 hours as needed for Pain   Yes Historical Provider, MD        Allergies:       Seasonal    Social History:     Tobacco:    reports that she has never smoked. She has never used smokeless tobacco.  Alcohol:      reports no history of alcohol use. Drug Use:  reports no history of drug use. Family History:     No family history on file.     Review of Systems:     Positive and Negative as described in HPI    Review of Systems   Constitutional: Negative. Respiratory: Negative. Gastrointestinal: Negative. Physical Exam:     Vitals:  /60   Pulse 67   Ht 5' 7\" (1.702 m)   Wt 174 lb (78.9 kg)   SpO2 99%   BMI 27.25 kg/m²   Physical Exam  Vitals signs and nursing note reviewed. Constitutional:       General: She is not in acute distress. Appearance: Normal appearance. She is well-developed. She is not ill-appearing. Pulmonary:      Effort: Pulmonary effort is normal.   Skin:     General: Skin is warm and dry. Comments: R axilla, medial aspect: Fusiform area of tender induration, no erythema present, overall dimensions approximately 1.5 x 3 cm   Neurological:      Mental Status: She is alert and oriented to person, place, and time. Psychiatric:         Judgment: Judgment normal.         Data:     Lab Results   Component Value Date     09/02/2020    K 3.8 09/02/2020     09/02/2020    CO2 26 09/02/2020    BUN 9 09/02/2020    CREATININE 0.75 09/02/2020    GLUCOSE 102 09/02/2020    PROT 9.7 09/02/2020    LABALBU 4.7 09/02/2020    BILITOT 0.50 09/02/2020    ALKPHOS 130 09/02/2020    AST 25 09/02/2020    ALT 34 09/02/2020     Lab Results   Component Value Date    WBC 7.3 09/02/2020    RBC 5.24 09/02/2020    HGB 15.2 09/02/2020    HCT 44.8 09/02/2020    MCV 85.5 09/02/2020    MCH 28.9 09/02/2020    MCHC 33.8 09/02/2020    RDW 13.4 09/02/2020     09/02/2020    MPV NOT REPORTED 09/02/2020     Lab Results   Component Value Date    TSH 2.48 11/05/2019     Lab Results   Component Value Date    LABA1C 4.8 12/16/2016         Assessment & Plan:        Diagnosis Orders   1. Breast pain  US BREAST COMPLETE RIGHT   2. Pain in right axilla  US BREAST COMPLETE RIGHT    US EXTREMITY RIGHT NON VASC LIMITED   3. Recurrent major depressive disorder, in partial remission (HCC)     Intermittent painful skin lesions in breasts and axillae over past few months, referred to dermatology and has an appointment in a few weeks.   Today patient does have an active lesion in R axilla which doesn't actually have any specific abnormality on surface of skin. US ordered for eval.   Depression uncontrolled but has had poor med compliance. Offered med increase but pt will try taking daily instead. Advised to take at bedtime with norethindrone. Requested Prescriptions     Signed Prescriptions Disp Refills    buPROPion (WELLBUTRIN XL) 150 MG extended release tablet 30 tablet 5     Sig: Take 1 tablet by mouth daily    hydrOXYzine (ATARAX) 25 MG tablet 60 tablet 2     Sig: Take 1 tablet by mouth every 8 hours as needed for Anxiety       There are no Patient Instructions on file for this visit. Shima Leal received counseling on the following healthy behaviors: medication adherence  Reviewed prior labs and health maintenance. Continue current medications, diet and exercise. Discussed use, benefit, and side effects of prescribed medications. Barriers to medication compliance addressed. Patient given educational materials - see patient instructions. All patient questions answered. Patient voiced understanding.      Electronically signed by Loc Hamilton DO on 1/4/2021 at 10:25 PM   [address]  [address] [place] [id number]  Dept: [phone]

## 2021-01-04 ENCOUNTER — TELEPHONE (OUTPATIENT)
Dept: FAMILY MEDICINE CLINIC | Age: 21
End: 2021-01-04

## 2021-01-04 ASSESSMENT — PATIENT HEALTH QUESTIONNAIRE - PHQ9
2. FEELING DOWN, DEPRESSED OR HOPELESS: 0
SUM OF ALL RESPONSES TO PHQ9 QUESTIONS 1 & 2: 0
SUM OF ALL RESPONSES TO PHQ QUESTIONS 1-9: 0
SUM OF ALL RESPONSES TO PHQ QUESTIONS 1-9: 0

## 2021-01-04 ASSESSMENT — ENCOUNTER SYMPTOMS
RESPIRATORY NEGATIVE: 1
GASTROINTESTINAL NEGATIVE: 1

## 2021-01-04 NOTE — TELEPHONE ENCOUNTER
----- Message from Oliver Webb DO sent at 1/4/2021  8:15 AM EST -----  Ultrasound all okay, did not identify any abnormality in the areas of her pain. Keep dermatology appointment.

## 2021-01-20 ENCOUNTER — TELEPHONE (OUTPATIENT)
Dept: FAMILY MEDICINE CLINIC | Age: 21
End: 2021-01-20

## 2021-01-20 NOTE — TELEPHONE ENCOUNTER
Received and reviewed dermatology note from NP. Looks like they recommended seeing a gynecologist for breast exam please ask patient, were they aware that she has already had a breast exam and also breast ultrasound? I am not satisfied with that plan and would prefer that she see a physician from that practice or go to a different dermatology practice.

## 2021-01-21 NOTE — TELEPHONE ENCOUNTER
I spoke with the nurse with the NP, they did not receive any information concerning her visit with the breast exam or the [address]. This information was faxed to [address] [place] [address].

## 2021-01-22 ENCOUNTER — TELEPHONE (OUTPATIENT)
Dept: FAMILY MEDICINE CLINIC | Age: 21
End: 2021-01-22

## 2021-01-22 DIAGNOSIS — L98.9 PAINFUL SKIN LESION: Primary | ICD-10-CM

## 2021-01-22 DIAGNOSIS — M79.621 PAIN IN RIGHT AXILLA: ICD-10-CM

## 2021-01-22 DIAGNOSIS — N64.4 BREAST PAIN: ICD-10-CM

## 2021-01-22 NOTE — TELEPHONE ENCOUNTER
Patient states that the NP blew her off, didn't really even look at her and her visit lasted a total of 5 minutes. She was not satisfied with this provider. She is wanting to see an actual physician.

## 2021-01-22 NOTE — TELEPHONE ENCOUNTER
[facility] called and didn't realized Dr. Qamar Harry had also ordered an ultrasound for [facility]. She said Jb Barron was going to refer care back to Dr. Qamar Harry. Health Maintenance   Topic Date Due    Hepatitis C screen  2000    HPV vaccine (1 - 2-dose series) 06/05/2009    HIV screen  06/05/2013    Chlamydia screen  07/15/2020    Cervical cancer screen  07/15/2022    DTaP/Tdap/Td vaccine (7 - Td) 05/10/2029    Hepatitis B vaccine  Completed    Hib vaccine  Completed    Varicella vaccine  Completed    Flu vaccine  Completed    Hepatitis A vaccine  Aged Out    Meningococcal (ACWY) vaccine  Aged Out    Pneumococcal 0-64 years Vaccine  Aged Out             (applicable per patient's age: Cancer Screenings, Depression Screening, Fall Risk Screening, Immunizations)    Hemoglobin A1C (%)   Date Value   12/16/2016 4.8     AST (U/L)   Date Value   09/02/2020 25     ALT (U/L)   Date Value   09/02/2020 34 (H)     BUN (mg/dL)   Date Value   09/02/2020 9      (goal A1C is < 7)   (goal LDL is <100) need 30-50% reduction from baseline     BP Readings from Last 3 Encounters:   12/31/20 100/60   12/04/20 120/62   09/30/20 100/60    (goal /80)      All Future Testing planned in [facility]:  Lab Frequency Next Occurrence   Hepatic Function Panel Once 09/02/2021   US EXTREMITY RIGHT NON VASC LIMITED Once 12/31/2020       Next Visit Date:  No future appointments.          Patient Active Problem List:     Migraine with aura and without status migrainosus, not intractable     Primary dysmenorrhea

## 2021-06-28 RX ORDER — ACETAMINOPHEN AND CODEINE PHOSPHATE 120; 12 MG/5ML; MG/5ML
SOLUTION ORAL
Qty: 84 TABLET | Refills: 1 | Status: SHIPPED | OUTPATIENT
Start: 2021-06-28 | End: 2021-12-27

## 2021-09-02 ENCOUNTER — PATIENT MESSAGE (OUTPATIENT)
Dept: FAMILY MEDICINE CLINIC | Age: 21
End: 2021-09-02

## 2021-09-02 RX ORDER — ALBUTEROL SULFATE 90 UG/1
2 AEROSOL, METERED RESPIRATORY (INHALATION) EVERY 6 HOURS PRN
Qty: 1 EACH | Refills: 5 | Status: SHIPPED | OUTPATIENT
Start: 2021-09-02

## 2021-09-02 NOTE — TELEPHONE ENCOUNTER
From: Johnny Chaudhry  To: Miguel Montes DO  Sent: 9/2/2021 8:37 AM EDT  Subject: Mahi Russell,    I was wondering if there was anyway I could have my inhaler again? I wasn't sure if I needed to schedule an appointment to see you or if you could write a script for it?     Thank you,  Salty Montoya

## 2021-09-17 ENCOUNTER — OFFICE VISIT (OUTPATIENT)
Dept: PRIMARY CARE CLINIC | Age: 21
End: 2021-09-17
Payer: COMMERCIAL

## 2021-09-17 VITALS
DIASTOLIC BLOOD PRESSURE: 94 MMHG | WEIGHT: 167.3 LBS | BODY MASS INDEX: 26.26 KG/M2 | SYSTOLIC BLOOD PRESSURE: 131 MMHG | HEART RATE: 103 BPM | TEMPERATURE: 98 F | HEIGHT: 67 IN | RESPIRATION RATE: 20 BRPM | OXYGEN SATURATION: 98 %

## 2021-09-17 DIAGNOSIS — S16.1XXA NECK STRAIN, INITIAL ENCOUNTER: ICD-10-CM

## 2021-09-17 DIAGNOSIS — S29.011A MUSCLE STRAIN OF CHEST WALL, INITIAL ENCOUNTER: Primary | ICD-10-CM

## 2021-09-17 DIAGNOSIS — S46.911A MUSCLE STRAIN OF RIGHT SHOULDER, INITIAL ENCOUNTER: ICD-10-CM

## 2021-09-17 PROCEDURE — 99213 OFFICE O/P EST LOW 20 MIN: CPT | Performed by: NURSE PRACTITIONER

## 2021-09-17 RX ORDER — PREDNISONE 20 MG/1
TABLET ORAL
Qty: 10 TABLET | Refills: 0 | Status: SHIPPED | OUTPATIENT
Start: 2021-09-17 | End: 2021-11-09

## 2021-09-17 ASSESSMENT — ENCOUNTER SYMPTOMS
SORE THROAT: 0
SHORTNESS OF BREATH: 1
VOMITING: 0
RHINORRHEA: 0
DIARRHEA: 0
WHEEZING: 0
COUGH: 0
NAUSEA: 0

## 2021-09-17 NOTE — LETTER
[facility] [id number]  Phone: [phone]  Fax: Carmenza Liriano 42, APRN - CNP        September 17, 2021     Patient: Carrington Zamora   YOB: 2000   Date of Visit: 9/17/2021       To Whom it May Concern:    Jessi Manrique was seen in my clinic on 9/17/2021. She may return to school on 09/20/2021 with no lifting, carrying, pushing or pulling over 10 lbs. X 1 week ending 09/27/2021. Please excuse for 09/17/2021,    If you have any questions or concerns, please don't hesitate to call.     Sincerely,         Christiana Henry, SASHA - CNP

## 2021-09-17 NOTE — PATIENT INSTRUCTIONS
Patient Education        Muscle Strain: Care Instructions  Your Care Instructions     A muscle strain happens when you overstretch, or pull, a muscle. It can happen when you exercise or lift something or when you have an accident. Rest and other home care can help the muscle heal.  Follow-up care is a key part of your treatment and safety. Be sure to make and go to all appointments, and call your doctor if you are having problems. It's also a good idea to know your test results and keep a list of the medicines you take. How can you care for yourself at home? · Rest the strained muscle. Do not put weight on it for a day or two. If your doctor advises you to, use crutches or a sling to rest a sore limb. · Put ice or a cold pack on the sore muscle for 10 to 20 minutes at a time to stop swelling. Put a thin cloth between the ice pack and your skin. · Prop up the sore arm or leg on a pillow when you ice it or anytime you sit or lie down during the next 3 days. Try to keep it above the level of your heart. This will help reduce swelling. · Take pain medicines exactly as directed. ? If the doctor gave you a prescription medicine for pain, take it as prescribed. ? If you are not taking a prescription pain medicine, ask your doctor if you can take an over-the-counter medicine. · Do not do anything that makes the pain worse. Return to exercise gradually as you feel better. When should you call for help? Call your doctor now or seek immediate medical care if:    · You have new severe pain.     · Your injured limb is cool or pale or changes color.     · You have tingling, weakness, or numbness in your injured limb.     · You cannot move the injured area. Watch closely for changes in your health, and be sure to contact your doctor if:    · You cannot put weight on a joint, or it feels unsteady when you walk.     · Pain and swelling get worse or do not start to get better after 2 days of home treatment.    Where can you learn more? Go to [email or web address] and sign in to your [employer] account. Enter N738 in the [employer] box to learn more about \"Muscle Strain: Care Instructions. \"     If you do not have an account, please click on the \"Sign Up Now\" link. Current as of: November 16, 2020               Content Version: 12.9  © 2006-2021 [employer]. Care instructions adapted under license by [employer] [facility] ([facility]). If you have questions about a medical condition or this instruction, always ask your healthcare professional. [place] [address] any warranty or liability for your use of this information. · Rest, avoid any heavy lifting or strenuous activity. · Ice 15 to 20 minutes every 1 to 2 hours for first 72 hours, then alternate ice and moist heat 15 to 20 minutes every couple of hours. May take hot shower to relieve pain. · Gentle ROM and stretching. · Prednisone 40 mg daily x 5 days. Do not take with ibuprofen or Aleve. Take with food. · Aleve/Ibuprofen/Tylenol OTC as directed on package. · Patient information given for muscle strain. · Follow up with PCP or Walk in Care as needed if symptoms worsen or do not improve.   · To ER for increasing pain, weakness, swelling, redness, numbness, tingling, loss of sensation or inability to bear weight.

## 2021-09-17 NOTE — PROGRESS NOTES
[address] [facility] W[facility]-IN [facility]  [id number] [place] [address]  Dept: [phone]  Dept Fax: [phone]     Jovita Gordon is a 19 y.o. female who presents to the [facility] today for hermedical conditions/complaints as noted below. 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 The pain started that night and felt in RUQ abdomen, chest, right shoulder and neck. Denies any numbness or tingling). The problem occurs constantly. The problem is unchanged. The pain occurs in the context of an injury. The pain is present in the right ribs, right shoulder, neck and chest (RUQ abdomen). The pain is medium (Sharp pain 7/10, worse in side and is constant and intermittent in right side of neck. ). The symptoms are aggravated by any movement. Associated symptoms include chest pain (Right rib/side pain.) and shortness of breath (Hard to take deep breath due to pain in right chest.). Pertinent negatives include no diarrhea, fatigue, fever, headaches, nausea, rash, stiffness, vomiting or wheezing. (Shortness of breath.) Past treatments include acetaminophen and OTC NSAID. There is no swelling present. She has been behaving normally. Her past medical history is significant for chronic back pain (Lower back pain history). There is no history of rheumatic disease or sickle cell disease. Past Medical History:   Diagnosis Date    Allergic         Current Outpatient Medications   Medication Sig Dispense Refill    predniSONE (DELTASONE) 20 MG tablet Take 2 tablets by mouth daily x 5 days. Take with food.  10 tablet 0    injected, erythematous or bulging. Left Ear: Hearing, tympanic membrane, ear canal and external ear normal.  No middle ear effusion. No mastoid tenderness. Tympanic membrane is not injected, erythematous or bulging. Nose: Nose normal. No mucosal edema, congestion or rhinorrhea. Right Sinus: No maxillary sinus tenderness or frontal sinus tenderness. Left Sinus: No maxillary sinus tenderness or frontal sinus tenderness. Mouth/Throat:      Lips: Pink. Mouth: Mucous membranes are moist.      Pharynx: Oropharynx is clear. Uvula midline. No pharyngeal swelling, oropharyngeal exudate or posterior oropharyngeal erythema. Eyes:      General: No scleral icterus. Right eye: No discharge. Left eye: No discharge. Conjunctiva/sclera: Conjunctivae normal.      Pupils: Pupils are equal, round, and reactive to light. Cardiovascular:      Rate and Rhythm: Regular rhythm. Tachycardia present. Heart sounds: Normal heart sounds, S1 normal and S2 normal. No murmur heard. No friction rub. No gallop. Pulmonary:      Effort: Pulmonary effort is normal. No tachypnea, bradypnea, accessory muscle usage or respiratory distress. Breath sounds: Normal breath sounds and air entry. No decreased breath sounds, wheezing, rhonchi or rales. Musculoskeletal:      Cervical back: Tenderness (Tenderness to palpation along right side of neck down in to anterior shoulder and across clavicle. No edema, erythema or ecchymosis. ) present. No swelling, edema, deformity, erythema, rigidity, spasms, bony tenderness or crepitus. Decreased range of motion (Slightly limited ROM to neck with turning to left. ). Thoracic back: Tenderness (Tenderness to palpation along right ribs and lateral chest back to and including scapula. No erythema, ecchymosis or deformity noted. No crepitus.) present. No swelling, edema, deformity, signs of trauma, lacerations, spasms or bony tenderness.  Decreased range of motion. No scoliosis. Lumbar back: Normal.   Lymphadenopathy:      Cervical: No cervical adenopathy. Right cervical: No superficial or posterior cervical adenopathy. Left cervical: No superficial or posterior cervical adenopathy. Skin:     General: Skin is warm and dry. Coloration: Skin is not pale. Findings: No erythema or rash. Neurological:      Mental Status: She is alert and oriented to person, place, and time. Psychiatric:         Behavior: Behavior normal. Behavior is cooperative. BP (!) 131/94   Pulse 103   Temp 98 °F (36.7 °C) (Oral)   Resp 20   Ht 5' 7\" (1.702 m)   Wt 167 lb 4.8 oz (75.9 kg)   LMP 08/23/2021   SpO2 98%   BMI 26.20 kg/m²     Assessment:      Diagnosis Orders   1. Muscle strain of chest wall, initial encounter  predniSONE (DELTASONE) 20 MG tablet   2. Muscle strain of right shoulder, initial encounter  predniSONE (DELTASONE) 20 MG tablet   3. Neck strain, initial encounter  predniSONE (DELTASONE) 20 MG tablet       Plan:      Return if symptoms worsen or fail to improve, for Resume all previous medications as directed. Orders Placed This Encounter   Medications    predniSONE (DELTASONE) 20 MG tablet     Sig: Take 2 tablets by mouth daily x 5 days. Take with food. Dispense:  10 tablet     Refill:  0      · Rest, avoid any heavy lifting or strenuous activity. · Ice 15 to 20 minutes every 1 to 2 hours for first 72 hours, then alternate ice and moist heat 15 to 20 minutes every couple of hours. May take hot shower to relieve pain. · Gentle ROM and stretching. · Prednisone 40 mg daily x 5 days. Do not take with ibuprofen or Aleve. Take with food. · Aleve/Ibuprofen/Tylenol OTC as directed on package. · Patient information given for muscle strain. · Follow up with PCP or Walk in Care as needed if symptoms worsen or do not improve.   · To ER for increasing pain, weakness, swelling, redness, numbness, tingling, loss of sensation or inability to bear weight.      [facility] received counseling on the following healthy behaviors: rest and increased fluids. Patient given educational materials - see patient instructions. Discussed use,benefit, and side effects of prescribed medications. Treatment plan discussed at visit. Continue routine health care follow up. All patient questions answered. Pt voiced understanding.       Electronically signed by SASHA Burciaga Sa, CNP on 9/17/2021 at 11:48 AM

## 2021-09-17 NOTE — LETTER
[facility] [address]  Phone: [phone]  Fax: Carmenza Cordero, APRN - CNP        September 17, 2021     Patient: Nano Fulton   YOB: 2000   Date of Visit: 9/17/2021       To Whom it May Concern:    Desmond Knapp was seen in my clinic on 9/17/2021. She may return to work on 09/20/2021 with no lifting, carrying, pushing or pulling over 10 lbs. X 1 week, ending on 09/27/2021. If you have any questions or concerns, please don't hesitate to call.     Sincerely,         Franny Garcia, APRN - CNP

## 2021-10-19 ENCOUNTER — NURSE ONLY (OUTPATIENT)
Dept: FAMILY MEDICINE CLINIC | Age: 21
End: 2021-10-19
Payer: COMMERCIAL

## 2021-10-19 DIAGNOSIS — Z11.1 PPD SCREENING TEST: Primary | ICD-10-CM

## 2021-10-19 PROCEDURE — 86580 TB INTRADERMAL TEST: CPT | Performed by: FAMILY MEDICINE

## 2021-10-21 ENCOUNTER — NURSE ONLY (OUTPATIENT)
Dept: FAMILY MEDICINE CLINIC | Age: 21
End: 2021-10-21

## 2021-10-21 LAB
INDURATION: NORMAL
TB SKIN TEST: NEGATIVE

## 2021-10-22 ENCOUNTER — OFFICE VISIT (OUTPATIENT)
Dept: PRIMARY CARE CLINIC | Age: 21
End: 2021-10-22
Payer: COMMERCIAL

## 2021-10-22 ENCOUNTER — HOSPITAL ENCOUNTER (OUTPATIENT)
Dept: PREADMISSION TESTING | Age: 21
Setting detail: SPECIMEN
Discharge: HOME | End: 2021-10-22
Payer: COMMERCIAL

## 2021-10-22 VITALS
RESPIRATION RATE: 16 BRPM | HEIGHT: 67 IN | HEART RATE: 109 BPM | DIASTOLIC BLOOD PRESSURE: 81 MMHG | TEMPERATURE: 98.2 F | OXYGEN SATURATION: 99 % | WEIGHT: 167 LBS | SYSTOLIC BLOOD PRESSURE: 114 MMHG | BODY MASS INDEX: 26.21 KG/M2

## 2021-10-22 DIAGNOSIS — J02.9 SORE THROAT: ICD-10-CM

## 2021-10-22 DIAGNOSIS — R05.9 COUGH: ICD-10-CM

## 2021-10-22 DIAGNOSIS — H92.03 OTALGIA OF BOTH EARS: ICD-10-CM

## 2021-10-22 DIAGNOSIS — J06.9 VIRAL URI WITH COUGH: Primary | ICD-10-CM

## 2021-10-22 LAB — S PYO AG THROAT QL: NORMAL

## 2021-10-22 PROCEDURE — 99213 OFFICE O/P EST LOW 20 MIN: CPT | Performed by: NURSE PRACTITIONER

## 2021-10-22 PROCEDURE — 87880 STREP A ASSAY W/OPTIC: CPT | Performed by: NURSE PRACTITIONER

## 2021-10-22 PROCEDURE — C9803 HOPD COVID-19 SPEC COLLECT: HCPCS

## 2021-10-22 PROCEDURE — U0003 INFECTIOUS AGENT DETECTION BY NUCLEIC ACID (DNA OR RNA); SEVERE ACUTE RESPIRATORY SYNDROME CORONAVIRUS 2 (SARS-COV-2) (CORONAVIRUS DISEASE [COVID-19]), AMPLIFIED PROBE TECHNIQUE, MAKING USE OF HIGH THROUGHPUT TECHNOLOGIES AS DESCRIBED BY CMS-2020-01-R: HCPCS

## 2021-10-22 PROCEDURE — U0005 INFEC AGEN DETEC AMPLI PROBE: HCPCS

## 2021-10-22 RX ORDER — DEXTROMETHORPHAN HYDROBROMIDE, GUAIFENESIN AND PSEUDOEPHEDRINE HYDROCHLORIDE 15; 400; 60 MG/1; MG/1; MG/1
TABLET ORAL
Qty: 28 TABLET | Refills: 0 | Status: CANCELLED | OUTPATIENT
Start: 2021-10-22

## 2021-10-22 NOTE — PATIENT INSTRUCTIONS
Patient Education        Viral Respiratory Infection: Care Instructions  Your Care Instructions     Viruses are very small organisms. They grow in number after they enter your body. There are many types that cause different illnesses, such as colds and the mumps. The symptoms of a viral respiratory infection often start quickly. They include a fever, sore throat, and runny nose. You may also just not feel well. Or you may not want to eat much. Most viral respiratory infections are not serious. They usually get better with time and self-care. Antibiotics are not used to treat a viral infection. That's because antibiotics will not help cure a viral illness. In some cases, antiviral medicine can help your body fight a serious viral infection. Follow-up care is a key part of your treatment and safety. Be sure to make and go to all appointments, and call your doctor if you are having problems. It's also a good idea to know your test results and keep a list of the medicines you take. How can you care for yourself at home? · Rest as much as possible until you feel better. · Be safe with medicines. Take your medicine exactly as prescribed. Call your doctor if you think you are having a problem with your medicine. You will get more details on the specific medicine your doctor prescribes. · Take an over-the-counter pain medicine, such as acetaminophen (Tylenol), ibuprofen (Advil, Motrin), or naproxen (Aleve), as needed for pain and fever. Read and follow all instructions on the label. Do not give aspirin to anyone younger than 20. It has been linked to Reye syndrome, a serious illness. · Drink plenty of fluids. Hot fluids, such as tea or soup, may help relieve congestion in your nose and throat. If you have kidney, heart, or liver disease and have to limit fluids, talk with your doctor before you increase the amount of fluids you drink. · Try to clear mucus from your lungs by breathing deeply and coughing.   · Gargle with warm salt water once an hour. This can help reduce swelling and throat pain. Use 1 teaspoon of salt mixed in 1 cup of warm water. · Do not smoke or allow others to smoke around you. If you need help quitting, talk to your doctor about stop-smoking programs and medicines. These can increase your chances of quitting for good. To avoid spreading the virus  · Cough or sneeze into a tissue. Then throw the tissue away. · If you don't have a tissue, use your hand to cover your cough or sneeze. Then clean your hand. You can also cough into your sleeve. · Wash your hands often. Use soap and warm water. Wash for 15 to 20 seconds each time. · If you don't have soap and water near you, you can clean your hands with alcohol wipes or gel. When should you call for help? Call your doctor now or seek immediate medical care if:    · You have a new or higher fever.     · Your fever lasts more than 48 hours.     · You have trouble breathing.     · You have a fever with a stiff neck or a severe headache.     · You are sensitive to light.     · You feel very sleepy or confused. Watch closely for changes in your health, and be sure to contact your doctor if:    · You do not get better as expected. Where can you learn more? Go to https://[employer].sim4tec. org and sign in to your [employer] account. Enter U969 in the [facility] box to learn more about \"Viral Respiratory Infection: Care Instructions. \"     If you do not have an account, please click on the \"Sign Up Now\" link. Current as of: July 6, 2021               Content Version: 13.0  © 2951-9902 [employer]. Care instructions adapted under license by [facility] ([facility]). If you have questions about a medical condition or this instruction, always ask your healthcare professional. [place] [address] any warranty or liability for your use of this information.        Patient Education        dextromethorphan and guaifenesin  Pronunciation:  DEX troe me THOR fan and gwye FEN e sin  Brand:  Aquatab DM, Broncotron, Coricidin HBP Chest Congestion & Cough, DayQuil Mucus Control DM, Delsym Cough Plus Chest Congestion DM, Fenesin DM IR, G-Zyncof, Mucinex DM, Phlemex, Robitussin Cough + Chest Congestion DM, Safetussin DM, Siltussin DM, TabTussin DM, Tussin DM, Zyncof  What is the most important information I should know about dextromethorphan and guaifenesin? Do not use this medicine if you have used an MAO inhibitor in the past 14 days, such as isocarboxazid, linezolid, methylene blue injection, phenelzine, rasagiline, selegiline, or tranylcypromine. What is dextromethorphan and guaifenesin? Dextromethorphan is a cough suppressant. Guaifenesin is an expectorant. Dextromethorphan and guaifenesin is a combination medicine used to treat cough and chest congestion caused by the common cold or allergies. Dextromethorphan will not treat a cough that is caused by smoking. There are many brands and forms of this medication available and not all brands are listed on this leaflet. Dextromethorphan and guaifenesin may also be used for purposes not listed in this medication guide. What should I discuss with my healthcare provider before taking dextromethorphan and guaifenesin? Do not use this medicine if you have taken an MAO inhibitor in the past 14 days. A dangerous drug interaction could occur. MAO inhibitors include isocarboxazid, linezolid, methylene blue injection, phenelzine, rasagiline, selegiline, and tranylcypromine. Ask a doctor or pharmacist if this medicine is safe to use if you have:  · a cough with mucus; or  · asthma, emphysema, or chronic bronchitis. Ask a doctor before using this medicine if you are pregnant or breast-feeding. This medicine may contain phenylalanine. Check the medication label if you have phenylketonuria (PKU). How should I take dextromethorphan and guaifenesin?   Use exactly as directed on the label, or as prescribed by your doctor. Cold or cough medicine is only for short-term use until your symptoms clear up. Always follow directions on the medicine label about giving cough or cold medicine to a child. Do not use the medicine only to make a child sleepy. Death can occur from the misuse of cough or cold medicines in very young children. Measure liquid medicine carefully. Use the dosing syringe provided, or use a medicine dose-measuring device (not a kitchen spoon). You may need to shake the liquid before you measure a dose. Follow all directions on the label. Swallow the extended-release tablet  whole and do not crush, chew, or break it. Sprinkle the granules  directly onto your tongue and swallow right away. Drink extra fluids to help loosen the congestion and lubricate your throat while you are taking this medicine. Call your doctor if your symptoms do not improve after 7 days, or if you have a fever, rash, or headaches. If you need to have any type of surgery, tell the surgeon ahead of time if you have taken this medicine within the past few days. Store at room temperature away from moisture and heat. What happens if I miss a dose? Since dextromethorphan and guaifenesin is used when needed, you may not be on a dosing schedule. Skip any missed dose if it's almost time for your next dose. Do not use two doses at one time. What happens if I overdose? Seek emergency medical attention or call the Poison Help line at 1-261.352.8930. What should I avoid while taking dextromethorphan and guaifenesin? Avoid driving or hazardous activity until you know how this medicine will affect you. Your reactions could be impaired. Drinking alcohol with this medicine can cause side effects. Ask a doctor or pharmacist before using other cough or cold medicines that may contain similar ingredients. What are the possible side effects of dextromethorphan and guaifenesin?   Get emergency medical help if you have signs of an allergic reaction: hives; difficult breathing; swelling of your face, lips, tongue, or throat. Stop using this medicine and call your doctor at once if you have:  · mood changes;  · severe headache; or  · severe dizziness or anxiety, feeling like you might pass out;. Common side effects may include:  · dizziness, drowsiness;  · sleep problems (insomnia);  · diarrhea; or  · feeling nervous, restless, anxious, or irritable;. This is not a complete list of side effects and others may occur. Call your doctor for medical advice about side effects. You may report side effects to FDA at 5-712-AZL-7534. What other drugs will affect dextromethorphan and guaifenesin? Avoid using this medicine with other drugs that cause drowsiness or slow your breathing (such as opioid medicine, a muscle relaxer, or medicine for anxiety or seizures). Ask a doctor or pharmacist before using this medicine if you are also using any other drugs, including prescription and over-the-counter medicines, vitamins, and herbal products. Some medicines can cause unwanted or dangerous effects when used together. Not all possible interactions are listed in this medication guide. Where can I get more information? Your pharmacist can provide more information about dextromethorphan and guaifenesin. Remember, keep this and all other medicines out of the reach of children, never share your medicines with others, and use this medication only for the indication prescribed. Every effort has been made to ensure that the information provided by [address] is accurate, up-to-date, and complete, but no guarantee is made to that effect. Drug information contained herein may be time sensitive.  [employer] information has been compiled for use by healthcare practitioners and consumers in the [place] and therefore [employer] does not warrant that uses outside of the [place] are appropriate, unless specifically indicated otherwise. [facility]'s drug information does not endorse drugs, diagnose patients or recommend therapy. [facility]'s drug information is an informational resource designed to assist licensed healthcare practitioners in caring for their patients and/or to serve consumers viewing this service as a supplement to, and not a substitute for, the expertise, skill, knowledge and judgment of healthcare practitioners. The absence of a warning for a given drug or drug combination in no way should be construed to indicate that the drug or drug combination is safe, effective or appropriate for any given patient. [facility] does not assume any responsibility for any aspect of healthcare administered with the aid of information [facility] provides. The information contained herein is not intended to cover all possible uses, directions, precautions, warnings, drug interactions, allergic reactions, or adverse effects. If you have questions about the drugs you are taking, check with your doctor, nurse or pharmacist.  Copyright 9552-3248 167 [address] Binu: 6. 11. Revision date: 11/2/2018. Care instructions adapted under license by [facility] ([facility]). If you have questions about a medical condition or this instruction, always ask your healthcare professional. [place] [address] any warranty or liability for your use of this information. Patient Education        pseudoephedrine  Pronunciation:  CJ wei FED rin  Brand:  Chlor Trimeton Nasal Decongestant, Contac Cold, Drixoral Decongestant Non-Drowsy, Elixsure Decongestant, Genaphed, Nasofed, Emery, Luclile, Sudafed, Sudafed Children's Nasal Decongestant, SudoGest, Suphedrin, Triaminic Softchews Allergy Congestion  What is the most important information I should know about pseudoephedrine?   Do not use pseudoephedrine if you have used an MAO inhibitor in the past 14 days, such as isocarboxazid, linezolid, methylene blue injection, phenelzine, rasagiline, selegiline, or tranylcypromine. Always ask a doctor before giving a cough or cold medicine to a child. What is pseudoephedrine? Pseudoephedrine is a decongestant that shrinks blood vessels in the nasal passages. Dilated blood vessels can cause nasal congestion (stuffy nose). Pseudoephedrine is used to treat nasal and sinus congestion, or congestion of the tubes that drain fluid from your inner ears, called the eustachian (jm-STAY-shun) tubes. Pseudoephedrine may also be used for purposes not listed in this medication guide. What should I discuss with my healthcare provider before taking pseudoephedrine? You should not use this medicine if you are allergic to pseudoephedrine. Do not use pseudoephedrine if you have used an MAO inhibitor in the past 14 days. A dangerous drug interaction could occur. MAO inhibitors include isocarboxazid, linezolid, methylene blue injection, phenelzine, rasagiline, selegiline, tranylcypromine, and others. Ask a doctor or pharmacist if it is safe for you to use pseudoephedrine if you have other medical conditions, especially:  · heart disease or high blood pressure;  · enlarged prostate and urination problems;  · diabetes; or  · a thyroid disorder. Artificially sweetened liquid medicine may contain phenylalanine. Check the medication label if you have phenylketonuria (PKU). It is not known whether pseudoephedrine will harm an unborn baby. Do not use this medicine without a doctor's advice if you are pregnant. It is not known whether pseudoephedrine passes into breast milk or if it could harm a nursing baby. Do not use this medicine without a doctor's advice if you are breast-feeding a baby. How should I take pseudoephedrine? Use exactly as directed on the label, or as prescribed by your doctor. Do not use in larger or smaller amounts or for longer than recommended. Cold medicine is usually taken only for a short time until your symptoms clear up.   Do not give this medication to a child younger than 3years old. Always ask a doctor before giving a cough or cold medicine to a child. Death can occur from the misuse of cough and cold medicines in very young children. Take this medicine with a full glass of water. Do not crush, chew, or break an extended-release tablet. Swallow it whole. You may need to shake the oral suspension (liquid) well just before you measure a dose. Measure liquid medicine with the dosing syringe provided, or with a special dose-measuring spoon or medicine cup. If you do not have a dose-measuring device, ask your pharmacist for one. Call your doctor if your symptoms do not improve after 7 days of treatment, or if you have a fever with a headache, cough, or skin rash. If you need surgery, tell the surgeon ahead of time that you are using pseudoephedrine. You may need to stop using the medicine for a short time. Store at room temperature away from moisture and heat. Protect the liquid medicine from light, and do not allow it to freeze. What happens if I miss a dose? Since pseudoephedrine is used when needed, you may not be on a dosing schedule. If you are on a schedule, use the missed dose as soon as you remember. Skip the missed dose if it is almost time for your next scheduled dose. Do not use extra medicine to make up the missed dose. What happens if I overdose? Seek emergency medical attention or call the Poison Help line at 1-421.626.3234. Overdose symptoms may include hallucinations, slow heartbeats, or seizure (convulsions). What should I avoid while taking pseudoephedrine? Avoid taking this medication if you also take diet pills, caffeine pills, or other stimulants (such as ADHD medications). Taking a stimulant together with a decongestant can increase your risk of unpleasant side effects. Ask a doctor or pharmacist before using any other cough or cold medicine. Many combination medicines contain pseudoephedrine or another decongestant.  Taking certain products together can cause you to get too much of this type of medicine. What are the possible side effects of pseudoephedrine? Get emergency medical help if you have signs of an allergic reaction: hives; difficult breathing; swelling of your face, lips, tongue, or throat. Stop using pseudoephedrine and call your doctor at once if you have:  · fast or pounding heartbeats;  · severe dizziness;  · severe nervousness; or  · sleep problems (insomnia). Common side effects may include:  · feeling restless or nervous. This is not a complete list of side effects and others may occur. Call your doctor for medical advice about side effects. You may report side effects to FDA at 6-646-OOZ-7207. What other drugs will affect pseudoephedrine? Other drugs may interact with pseudoephedrine, including prescription and over-the-counter medicines, vitamins, and herbal products. Tell each of your health care providers about all medicines you use now and any medicine you start or stop using. Where can I get more information? Your pharmacist can provide more information about pseudoephedrine. Remember, keep this and all other medicines out of the reach of children, never share your medicines with others, and use this medication only for the indication prescribed. Every effort has been made to ensure that the information provided by [address] is accurate, up-to-date, and complete, but no guarantee is made to that effect. Drug information contained herein may be time sensitive. [facility] information has been compiled for use by healthcare practitioners and consumers in the [place] and therefore [facility] does not warrant that uses outside of the [place] are appropriate, unless specifically indicated otherwise. [facility] drug information does not endorse drugs, diagnose patients or recommend therapy.  [facility] drug information is an informational resource designed to assist licensed healthcare practitioners in caring for their patients and/or to serve consumers viewing this service as a supplement to, and not a substitute for, the expertise, skill, knowledge and judgment of healthcare practitioners. The absence of a warning for a given drug or drug combination in no way should be construed to indicate that the drug or drug combination is safe, effective or appropriate for any given patient. [facility] does not assume any responsibility for any aspect of healthcare administered with the aid of information [facility] provides. The information contained herein is not intended to cover all possible uses, directions, precautions, warnings, drug interactions, allergic reactions, or adverse effects. If you have questions about the drugs you are taking, check with your doctor, nurse or pharmacist.  Copyright 8445-3962 167 Luis[address] Binu: 7.03. Revision date: 2/1/2016. Care instructions adapted under license by [facility] ([facility]). If you have questions about a medical condition or this instruction, always ask your healthcare professional. [place] [address] any warranty or liability for your use of this information. Patient Education        Learning About Coronavirus [phone])  What is coronavirus (COVID-19)? COVID-19 is a disease caused by a type of coronavirus. This illness was first found in December 2019. It has since spread worldwide. Coronaviruses are a large group of viruses. They cause the common cold. They also cause more serious illnesses like [place] respiratory syndrome (MERS) and severe acute respiratory syndrome (SARS). COVID-19 is caused by a novel coronavirus. That means it's a new type that has not been seen in people before. What are the symptoms? COVID-19 symptoms may include:  · Fever. · Cough. · Trouble breathing. · Chills or repeated shaking with chills. · Muscle and body aches. · Headache. · Sore throat.   · New loss of taste or smell.  · Vomiting. · Diarrhea. In severe cases, COVID-19 can cause pneumonia and make it hard to breathe without help from a machine. It can cause death. How is it diagnosed? COVID-19 is diagnosed with a viral test. This may also be called a PCR test or antigen test. It looks for evidence of the virus in your breathing passages or lungs (respiratory system). The test is most often done on a sample from the nose, throat, or lungs. It's sometimes done on a sample of saliva. One way a sample is collected is by putting a long swab into the back of your nose. How is it treated? Mild cases of COVID-19 can be treated at home. Serious cases need treatment in the hospital. Treatment may include medicines to reduce symptoms, plus breathing support such as oxygen therapy or a ventilator. Some people may be placed on their belly to help their oxygen levels. Treatments that may help people who have COVID-19 include:  Antiviral medicines. These medicines treat viral infections. Remdesivir is an example. Immune-based therapy. These medicines help the immune system fight COVID-19. Examples include monoclonal antibodies. Blood thinners. These medicines help prevent blood clots. People with severe illness are at risk for blood clots. How can you protect yourself and others? The best way to protect yourself from getting sick is to:  · Get vaccinated. · Avoid sick people. · If you are not fully vaccinated:  ? Wear a mask if you have to go to public areas. ? Avoid crowds and try to stay at least 6 feet away from other people. · Cover your mouth with a tissue when you cough or sneeze. · Wash your hands often, especially after you cough or sneeze. Use soap and water, and scrub for at least 20 seconds. If soap and water aren't available, use an alcohol-based hand . · Avoid touching your mouth, nose, and eyes. To help avoid spreading the virus to others:  · Get vaccinated.   · Cover your mouth with a tissue when you cough or sneeze. · Wash your hands often, especially after you cough or sneeze. Use soap and water, and scrub for at least 20 seconds. If soap and water aren't available, use an alcohol-based hand . · If you have been exposed to the virus and are not fully vaccinated:  ? Stay home. Don't go to school, work, or public areas. And don't use public transportation, ride-shares, or taxis unless you have no choice. ? Wear a mask if you have to go to public areas, like the pharmacy. · If you're sick:  ? Leave your home only if you need to get medical care. But call the doctor's office first so they know you're coming. And wear a mask. ? Wear a mask whenever you're around other people. ? Limit contact with pets and people in your home. If possible, stay in a separate bedroom and use a separate bathroom. ? Clean and disinfect your home every day. Use household  and disinfectant wipes or sprays. Take special care to clean things that you touch with your hands. How can you self-isolate when you have COVID-19? If you have COVID-19, there are things you can do to help avoid spreading the virus to others. · Limit contact with people in your home. If possible, stay in a separate bedroom and use a separate bathroom. · Wear a mask when you are around other people. · If you have to leave home, avoid crowds and try to stay at least 6 feet away from other people. · Avoid contact with pets and other animals. · Cover your mouth and nose with a tissue when you cough or sneeze. Then throw it in the trash right away. · Wash your hands often, especially after you cough or sneeze. Use soap and water, and scrub for at least 20 seconds. If soap and water aren't available, use an alcohol-based hand . · Don't share personal household items. These include bedding, towels, cups and glasses, and eating utensils.   · [address] laundry in the warmest water allowed for the fabric type, and dry it completely. It's okay to wash other people's laundry with yours. · Clean and disinfect your home. Use household  and disinfectant wipes or sprays. When should you call for help? Call 911 anytime you think you may need emergency care. For example, call if you have life-threatening symptoms, such as:    · You have severe trouble breathing. (You can't talk at all.)     · You have constant chest pain or pressure.     · You are severely dizzy or lightheaded.     · You are confused or can't think clearly.     · You have pale, gray, or blue-colored skin or lips.     · You pass out (lose consciousness) or are very hard to wake up. Call your doctor now or seek immediate medical care if:    · You have moderate trouble breathing. (You can't speak a full sentence.)     · You are coughing up blood (more than about 1 teaspoon).     · You have signs of low blood pressure. These include feeling lightheaded; being too weak to stand; and having cold, pale, clammy skin. Watch closely for changes in your health, and be sure to contact your doctor if:    · Your symptoms get worse.     · You are not getting better as expected.     · You have new or worse symptoms of anxiety, depression, nightmares, or flashbacks. Call before you go to the doctor's office. Follow their instructions. And wear a mask. Current as of: July 1, 2021               Content Version: 13.0  © 2006-2021 [employer]. Care instructions adapted under license by [facility] ([facility]). If you have questions about a medical condition or this instruction, always ask your healthcare professional. [address] any warranty or liability for your use of this information.

## 2021-10-22 NOTE — LETTER
[facility] In  [facility] [address]  Phone: Hong Multani [phone], APRN - CNP      10/22/2021     Patient: Otilio Tejada   YOB: 2000       To Whom It May Concern: It is my medical opinion that Otilio Tejada should remain out of school/work while acutely ill and awaiting COVID-19 test results. Return to school/work with no retesting should be followed if test is negative AND meets these criteria as outlined by CDC/ODH:     a. No fever without the use of fever reducers for 24 hours  b. Improvement in symptoms     If tests positive for COVID-19, needs minimum of 10 days strict quarantine, improvement of symptoms and 24 hours fever free without fever reducing medications. If you have any questions or concerns, please don't hesitate to call.     Sincerely,          Titi Sosa, APRN - CNP

## 2021-10-22 NOTE — PROGRESS NOTES
Chief Complaint   Pharyngitis (cough, chills, bodyaches, bilateral ear pain x 4 days. )      History of Present Illness   Source of history provided by: patient. Christa Marks is a 19 y.o. old female who has a past medical history of:   Past Medical History:   Diagnosis Date    Allergic     Presents to the clinic for evaluation of 4 days of sore throat, body aches and bilateral ear pain. According to [facility] the throat feels \"scratchy but when I swallow it feels like razor blades are back there\". Cough is intermittent and not congested. She has not measured any fever but has had body chills. She has been using her over-the-counter allergy medications as well as Advil Cold and Sinus with little relief. 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 Denies CP, dyspnea, LE edema, abdominal pain, vomiting, rash, or lethargy. ROS   Pertinent positives and negatives are stated within HPI, all other systems reviewed and are negative. Surgical History:  has no past surgical history on file. Social History:  reports that she has never smoked. She has never used smokeless tobacco. She reports that she does not drink alcohol and does not use drugs. Family History: family history is not on file. Allergies: Seasonal    Physical Exam    VS:  /81   Pulse 109   Temp 98.2 °F (36.8 °C)   Resp 16   Ht 5' 7\" (1.702 m)   Wt 167 lb (75.8 kg)   SpO2 99%   BMI 26.16 kg/m²    Oxygen Saturation Interpretation: Normal.    Constitutional:  Alert, development consistent with age. NAD. Head:  NC/NT. Airway patent. Mouth: Posterior pharynx with mild erythema and clear postnasal drip. No tonsillar hypertrophy or exudate. Neck:  Normal ROM. Supple. No anterior cervical adenopathy noted. Lungs: CTAB without wheezes, rales, or rhonchi.    CV:  Regular rate and rhythm, normal heart sounds, without pathological murmurs, ectopy, gallops, or rubs. Skin:  Normal turgor. Warm, dry, without visible rash. Lymphatic: No lymphangitis or adenopathy noted. Neurological:  Oriented. Motor functions intact. Lab / Imaging Results   (All laboratory and radiology results have been personally reviewed by myself)  Labs:  No results found for this visit on 10/22/21. Imaging: All Radiology results interpreted by Radiologist unless otherwise noted. No results found. Medical Decision Making   Pt non-toxic, in no apparent distress and stable at time of discharge. Assessment/Plan   Selene was seen today for pharyngitis. Diagnoses and all orders for this visit:    Viral URI with cough    Cough  -     COVID-19; Future    Sore throat  -     POCT rapid strep A  -     COVID-19; Future    Otalgia of both ears      19y.o. year old female presenting with sore throat and cough. Raeann Martinez appears well, hydrated and with clear lung sounds without distress. POCT rapid strep reported as negative. Discussed with [facility] symptoms are likely viral and will resolve with time and symptomatic care including rest, good oral hydration, and over-the-counter antipyretic as labeled of patient choice for body aches/fever. Discussed and recommend COVID-19 testing as she works with ill persons. COVID-19 outpatient order given with instructions to have done at [facility], will call with results once available. Advised cautionary self-quarantine at home in the interim. Schedule virtual f/u with PCP in 7-10 days if symptoms persist. ED sooner if symptoms worsen or change. Dale Hurley, APRN - CNP    This visit was provided as a focused evaluation during the [place] -19 pandemic/national emergency. A comprehensive review of all previous patient history and testing was not conducted. Pertinent findings were elicited during the visit.

## 2021-10-23 LAB
SARS-COV-2: NORMAL
SARS-COV-2: NOT DETECTED
SOURCE: NORMAL

## 2021-11-09 ENCOUNTER — OFFICE VISIT (OUTPATIENT)
Dept: FAMILY MEDICINE CLINIC | Age: 21
End: 2021-11-09
Payer: COMMERCIAL

## 2021-11-09 VITALS
DIASTOLIC BLOOD PRESSURE: 70 MMHG | TEMPERATURE: 99.4 F | BODY MASS INDEX: 26.37 KG/M2 | HEART RATE: 89 BPM | WEIGHT: 168 LBS | SYSTOLIC BLOOD PRESSURE: 120 MMHG | OXYGEN SATURATION: 98 % | HEIGHT: 67 IN

## 2021-11-09 DIAGNOSIS — R07.0 THROAT PAIN: Primary | ICD-10-CM

## 2021-11-09 DIAGNOSIS — R13.12 OROPHARYNGEAL DYSPHAGIA: ICD-10-CM

## 2021-11-09 PROCEDURE — 99213 OFFICE O/P EST LOW 20 MIN: CPT | Performed by: FAMILY MEDICINE

## 2021-11-09 NOTE — PATIENT INSTRUCTIONS
SURVEY:    You may be receiving a survey from [employer] regarding your visit today. You may get this in the mail, through your MyChart or in your email. Please complete the survey to enable us to provide the highest quality of care to you and your family. If you cannot score us as very good ( 5 Stars) on any question, please feel free to call the office to discuss how we could have made your experience exceptional.     Thank you.     Clinical Care Team:  DO Kandice Jones, [address] [place] [address] Team:  [address] [place] [address]

## 2021-11-09 NOTE — PROGRESS NOTES
Name: Dewayne Cintron  : 2000         Chief Complaint:     Chief Complaint   Patient presents with    Pharyngitis     pain left side of throat and left ear pain for over 2 weeks. History of Present Illness:      Dewayne Cintron is a 19 y.o.  female who presents with Pharyngitis (pain left side of throat and left ear pain for over 2 weeks. )      HPI    Patient complains of left throat pain. Patient and her fiancé had both been sick a couple wks ago with sore throat and swollen tonsils, went to walk-in and had negative strep and Covid tests. She is feeling better but still has a very painful area on L throat down lower than the tonsil. Sharp pain, radiates to ear if she moves a certain way, hurts more with swallowing (especially liquids), talking too loud, laughing a certain way. Can cause pain in whole side of head. At times it interferes with her voice. Pain has not improved and has not been helped by Motrin, Flonase, cough drops, including Cepacol ones, Chloraseptic spray. Medical History:     Patient Active Problem List   Diagnosis    Migraine with aura and without status migrainosus, not intractable    Primary dysmenorrhea       Medications:       Prior to Admission medications    Medication Sig Start Date End Date Taking?  Authorizing Provider   albuterol sulfate HFA (VENTOLIN HFA) 108 (90 Base) MCG/ACT inhaler Inhale 2 puffs into the lungs every 6 hours as needed for Wheezing or Shortness of Breath 21  Yes Shayna Paz,    norethindrone (MICRONOR) 0.35 MG tablet take 1 tablet by mouth once daily 21  Yes Shayna Paz DO   buPROPion (WELLBUTRIN XL) 150 MG extended release tablet Take 1 tablet by mouth daily 20  Yes Shayna Paz DO   hydrOXYzine (ATARAX) 25 MG tablet Take 1 tablet by mouth every 8 hours as needed for Anxiety 20  Yes Shayna Paz DO   acetaminophen (TYLENOL) 325 MG tablet Take 650 mg by mouth every 6 hours as needed for Pain Yes Historical Provider, MD        Allergies:       Seasonal    Physical Exam:     Vitals:  /70   Pulse 89   Temp 99.4 °F (37.4 °C)   Ht 5' 7\" (1.702 m)   Wt 168 lb (76.2 kg)   SpO2 98%   BMI 26.31 kg/m²   Physical Exam  Vitals and nursing note reviewed. Constitutional:       Appearance: She is well-developed. She is not ill-appearing. HENT:      Head: Normocephalic and atraumatic. Right Ear: Tympanic membrane and ear canal normal.      Left Ear: Tympanic membrane and ear canal normal.      Nose: Nose normal.      Mouth/Throat:      Mouth: Mucous membranes are moist.      Comments: Bilateral tonsils 1+, no erythema or exudate. The left tonsil is bilobed. Both tonsils chronically have a macerated appearance, and this is again present today. Tenderness present just lateral to the left side of the trachea in the anterior lymph node chain but no swelling or mass is palpable. Cardiovascular:      Rate and Rhythm: Normal rate and regular rhythm. Heart sounds: Normal heart sounds. Pulmonary:      Effort: Pulmonary effort is normal.      Breath sounds: Normal breath sounds. Musculoskeletal:      Cervical back: Neck supple. Lymphadenopathy:      Cervical: No cervical adenopathy. Neurological:      Mental Status: She is alert. Data:     October 22, 2021 COVID-19 and rapid strep both negative    Assessment & Plan:        Diagnosis Orders   1. Throat pain  External Referral To ENT   2. Oropharyngeal dysphagia  External Referral To ENT   Throat pain on the left side, some associated dysphagia, sometimes interruption in phonation. Very persistent pain for 2-1/2 weeks. Differential diagnosis would include lymphadenitis, though signs of this are not evident on exam, abrasion or other abnormality in the vallecula, thyroid abnormality, others. Referred to ENT for direct visualization.       Requested Prescriptions      No prescriptions requested or ordered in this encounter Patient Instructions   SURVEY:    You may be receiving a survey from [employer] regarding your visit today. You may get this in the mail, through your MyChart or in your email. Please complete the survey to enable us to provide the highest quality of care to you and your family. If you cannot score us as very good ( 5 Stars) on any question, please feel free to call the office to discuss how we could have made your experience exceptional.     Thank you. Clinical Care Team:  Dr. David Curtis DO                                           [address] Team:  [address] [place] [address] received counseling on the following healthy behaviors: medication adherence  Reviewed prior labs and health maintenance. Continue current medications, diet and exercise. Discussed use, benefit, and side effects of prescribed medications. Barriers to medication compliance addressed. Patient given educational materials - see patient instructions. All patient questions answered.   Patient voiced understanding.     signed by David Curtis DO on 11/9/2021 at 2:02 PM  [address]  [address] [place] [address]  Dept: [phone]

## 2021-12-27 RX ORDER — ACETAMINOPHEN AND CODEINE PHOSPHATE 120; 12 MG/5ML; MG/5ML
SOLUTION ORAL
Qty: 84 TABLET | Refills: 1 | Status: SHIPPED | OUTPATIENT
Start: 2021-12-27 | End: 2022-06-15 | Stop reason: SDUPTHER

## 2022-01-18 RX ORDER — BUPROPION HYDROCHLORIDE 150 MG/1
150 TABLET ORAL DAILY
Qty: 30 TABLET | Refills: 5 | Status: SHIPPED | OUTPATIENT
Start: 2022-01-18 | End: 2022-05-08 | Stop reason: SDUPTHER

## 2022-04-29 ENCOUNTER — HOSPITAL ENCOUNTER (OUTPATIENT)
Age: 22
Discharge: HOME | End: 2022-04-29
Payer: COMMERCIAL

## 2022-04-29 ENCOUNTER — TELEPHONE (OUTPATIENT)
Dept: FAMILY MEDICINE CLINIC | Age: 22
End: 2022-04-29

## 2022-04-29 ENCOUNTER — OFFICE VISIT (OUTPATIENT)
Dept: FAMILY MEDICINE CLINIC | Age: 22
End: 2022-04-29
Payer: COMMERCIAL

## 2022-04-29 VITALS
HEIGHT: 67 IN | WEIGHT: 173 LBS | SYSTOLIC BLOOD PRESSURE: 106 MMHG | DIASTOLIC BLOOD PRESSURE: 68 MMHG | OXYGEN SATURATION: 98 % | HEART RATE: 82 BPM | BODY MASS INDEX: 27.15 KG/M2

## 2022-04-29 DIAGNOSIS — G47.19 EXCESSIVE DAYTIME SLEEPINESS: Primary | ICD-10-CM

## 2022-04-29 DIAGNOSIS — F41.8 DEPRESSION WITH ANXIETY: ICD-10-CM

## 2022-04-29 DIAGNOSIS — G47.19 EXCESSIVE DAYTIME SLEEPINESS: ICD-10-CM

## 2022-04-29 DIAGNOSIS — H53.9 TRANSIENT VISION DISTURBANCE: ICD-10-CM

## 2022-04-29 DIAGNOSIS — R40.4 STARING EPISODES: ICD-10-CM

## 2022-04-29 LAB
ABSOLUTE EOS #: 0.1 K/UL (ref 0–0.4)
ABSOLUTE LYMPH #: 1.6 K/UL (ref 1–4.8)
ABSOLUTE MONO #: 0.4 K/UL (ref 0–1)
ALBUMIN SERPL-MCNC: 4.6 G/DL (ref 3.5–5.2)
ALP BLD-CCNC: 118 U/L (ref 35–104)
ALT SERPL-CCNC: 19 U/L (ref 5–33)
ANION GAP SERPL CALCULATED.3IONS-SCNC: 10 MMOL/L (ref 9–17)
AST SERPL-CCNC: 20 U/L
BASOPHILS # BLD: 0 % (ref 0–2)
BASOPHILS ABSOLUTE: 0 K/UL (ref 0–0.2)
BILIRUB SERPL-MCNC: 0.57 MG/DL (ref 0.3–1.2)
BUN BLDV-MCNC: 10 MG/DL (ref 6–20)
BUN/CREAT BLD: 13 (ref 9–20)
CALCIUM SERPL-MCNC: 9.7 MG/DL (ref 8.6–10.4)
CHLORIDE BLD-SCNC: 101 MMOL/L (ref 98–107)
CO2: 26 MMOL/L (ref 20–31)
CREAT SERPL-MCNC: 0.79 MG/DL (ref 0.5–0.9)
DIFFERENTIAL TYPE: YES
EOSINOPHILS RELATIVE PERCENT: 2 % (ref 0–5)
FOLATE: 5.5 NG/ML
GFR AFRICAN AMERICAN: >60 ML/MIN
GFR NON-AFRICAN AMERICAN: >60 ML/MIN
GFR SERPL CREATININE-BSD FRML MDRD: ABNORMAL ML/MIN/{1.73_M2}
GLUCOSE BLD-MCNC: 99 MG/DL (ref 70–99)
HCT VFR BLD CALC: 43.7 % (ref 36–46)
HEMOGLOBIN: 14.6 G/DL (ref 12–16)
LYMPHOCYTES # BLD: 24 % (ref 15–40)
MCH RBC QN AUTO: 28.1 PG (ref 26–34)
MCHC RBC AUTO-ENTMCNC: 33.3 G/DL (ref 31–37)
MCV RBC AUTO: 84.4 FL (ref 80–100)
MONOCYTES # BLD: 6 % (ref 4–8)
PDW BLD-RTO: 12.9 % (ref 12.1–15.2)
PLATELET # BLD: 276 K/UL (ref 140–450)
POTASSIUM SERPL-SCNC: 4.3 MMOL/L (ref 3.7–5.3)
RBC # BLD: 5.18 M/UL (ref 4–5.2)
SEG NEUTROPHILS: 68 % (ref 47–75)
SEGMENTED NEUTROPHILS ABSOLUTE COUNT: 4.3 K/UL (ref 2.5–7)
SODIUM BLD-SCNC: 137 MMOL/L (ref 135–144)
TOTAL PROTEIN: 7.4 G/DL (ref 6.4–8.3)
TSH SERPL DL<=0.05 MIU/L-ACNC: 2.89 UIU/ML (ref 0.3–5)
VITAMIN B-12: 637 PG/ML (ref 232–1245)
VITAMIN D 25-HYDROXY: 17.9 NG/ML
WBC # BLD: 6.5 K/UL (ref 3.5–11)

## 2022-04-29 PROCEDURE — 82607 VITAMIN B-12: CPT

## 2022-04-29 PROCEDURE — 82746 ASSAY OF FOLIC ACID SERUM: CPT

## 2022-04-29 PROCEDURE — 84443 ASSAY THYROID STIM HORMONE: CPT

## 2022-04-29 PROCEDURE — 36415 COLL VENOUS BLD VENIPUNCTURE: CPT

## 2022-04-29 PROCEDURE — 85025 COMPLETE CBC W/AUTO DIFF WBC: CPT

## 2022-04-29 PROCEDURE — 82306 VITAMIN D 25 HYDROXY: CPT

## 2022-04-29 PROCEDURE — 99214 OFFICE O/P EST MOD 30 MIN: CPT | Performed by: FAMILY MEDICINE

## 2022-04-29 PROCEDURE — 80053 COMPREHEN METABOLIC PANEL: CPT

## 2022-04-29 RX ORDER — MODAFINIL 200 MG/1
200 TABLET ORAL DAILY
Qty: 30 TABLET | Refills: 2 | Status: SHIPPED | OUTPATIENT
Start: 2022-04-29 | End: 2022-07-28

## 2022-04-29 SDOH — ECONOMIC STABILITY: FOOD INSECURITY: WITHIN THE PAST 12 MONTHS, YOU WORRIED THAT YOUR FOOD WOULD RUN OUT BEFORE YOU GOT MONEY TO BUY MORE.: NEVER TRUE

## 2022-04-29 SDOH — ECONOMIC STABILITY: FOOD INSECURITY: WITHIN THE PAST 12 MONTHS, THE FOOD YOU BOUGHT JUST DIDN'T LAST AND YOU DIDN'T HAVE MONEY TO GET MORE.: NEVER TRUE

## 2022-04-29 ASSESSMENT — PATIENT HEALTH QUESTIONNAIRE - PHQ9
9. THOUGHTS THAT YOU WOULD BE BETTER OFF DEAD, OR OF HURTING YOURSELF: 0
3. TROUBLE FALLING OR STAYING ASLEEP: 0
6. FEELING BAD ABOUT YOURSELF - OR THAT YOU ARE A FAILURE OR HAVE LET YOURSELF OR YOUR FAMILY DOWN: 0
8. MOVING OR SPEAKING SO SLOWLY THAT OTHER PEOPLE COULD HAVE NOTICED. OR THE OPPOSITE, BEING SO FIGETY OR RESTLESS THAT YOU HAVE BEEN MOVING AROUND A LOT MORE THAN USUAL: 0
10. IF YOU CHECKED OFF ANY PROBLEMS, HOW DIFFICULT HAVE THESE PROBLEMS MADE IT FOR YOU TO DO YOUR WORK, TAKE CARE OF THINGS AT HOME, OR GET ALONG WITH OTHER PEOPLE: 0
2. FEELING DOWN, DEPRESSED OR HOPELESS: 0
5. POOR APPETITE OR OVEREATING: 0
SUM OF ALL RESPONSES TO PHQ QUESTIONS 1-9: 3
4. FEELING TIRED OR HAVING LITTLE ENERGY: 2
SUM OF ALL RESPONSES TO PHQ QUESTIONS 1-9: 3
7. TROUBLE CONCENTRATING ON THINGS, SUCH AS READING THE NEWSPAPER OR WATCHING TELEVISION: 1

## 2022-04-29 ASSESSMENT — SOCIAL DETERMINANTS OF HEALTH (SDOH): HOW HARD IS IT FOR YOU TO PAY FOR THE VERY BASICS LIKE FOOD, HOUSING, MEDICAL CARE, AND HEATING?: NOT HARD AT ALL

## 2022-04-29 NOTE — PROGRESS NOTES
Name: Martha Antonio  : 2000         Chief Complaint:     Chief Complaint   Patient presents with    Fatigue     trouble with staying awake, worse while driving. History of Present Illness:      Martha Antonio is a 19 y.o.  female who presents with Fatigue (trouble with staying awake, worse while driving. )      HPI    Continues to have trouble staying awake during the day and it's getting worse. In general goes to bed 8 or 9, wake up at 4:30-5. Typically falls asleep pretty quick and if it's taking a while she'll watch an AMS[employer] video and fall asleep fast. May wake up occasionally just to roll over and goes immediately back to sleep. A couple times fiance has found that she put her arms up in the air and was rubbing her arms. No sore arm muscles or spasms. Unaware of these episodes. Over past few mos noticed a few (at least 4-5) times at clinicals while standing or walking that her vision would darken for a second and then she got a cold tingle that went over her whole body. After that, feels weird for the rest of the day, floating or like her body is really light, like she doesn't have control over things. No associated migraine. Has also had staring spells, can hear what's going on around her but is still and zoned out for a period of time. Had tried ritalin in 2019 but doesn't remember response to it. Drinks some alcohol on weekends or at parties, just 1 can. Mood greatly improved on wellbutrin. Medical History:     Patient Active Problem List   Diagnosis    Migraine with aura and without status migrainosus, not intractable    Primary dysmenorrhea       Medications:       Prior to Admission medications    Medication Sig Start Date End Date Taking?  Authorizing Provider   buPROPion (WELLBUTRIN XL) 150 MG extended release tablet take 1 tablet by mouth daily 22   Hosea Portillo DO   norethindrone (MICRONOR) 0.35 MG tablet take 1 tablet by mouth once daily 21 04/29/2022    LABALBU 4.6 04/29/2022    BILITOT 0.57 04/29/2022    ALKPHOS 118 04/29/2022    AST 20 04/29/2022    ALT 19 04/29/2022     Lab Results   Component Value Date    WBC 6.5 04/29/2022    RBC 5.18 04/29/2022    HGB 14.6 04/29/2022    HCT 43.7 04/29/2022    MCV 84.4 04/29/2022    MCH 28.1 04/29/2022    MCHC 33.3 04/29/2022    RDW 12.9 04/29/2022     04/29/2022    MPV NOT REPORTED 09/02/2020     Lab Results   Component Value Date    TSH 2.89 04/29/2022     Lab Results   Component Value Date    LABA1C 4.8 12/16/2016         Assessment & Plan:        Diagnosis Orders   1. Excessive daytime sleepiness  Comprehensive Metabolic Panel    CBC with Auto Differential    TSH with Reflex    Vitamin B12 & Folate    Vitamin D 25 Hydroxy    Baseline Diagnostic Sleep Study    External Referral To Neurology   2. Transient vision disturbance  Comprehensive Metabolic Panel    CBC with Auto Differential    TSH with Reflex    Vitamin B12 & Folate    Vitamin D 25 Hydroxy    External Referral To Neurology   3. Staring episodes  External Referral To Neurology   4. Depression with anxiety       Worsening daytime sleepiness, difficulty staying awake while driving and sometimes can almost fall asleep even while standing. Labs and sleep study ordered as above. She has also been having some abnormal episodes which I believe could potentially represent seizures. Additionally the nighttime arm movements could be some partial seizures. Referred to neurology. I am certainly aware that Wellbutrin can lower seizure threshold but she has responded to it very well in terms of mood, has trouble even if she misses med for 1 day, so while we are doing diagnostic work-up we will continue med at same dosing. Trial of provigil (don't take day of sleep study). Will notify pt of interaction between provigil and norethindrone.           Requested Prescriptions      No prescriptions requested or ordered in this encounter         There are no Patient Instructions on file for this visit.      signed by César Khoury DO on 4/29/2022 at 9:00 AM  [address]  Dept: [phone]

## 2022-04-29 NOTE — Clinical Note
Please let pt know I sent in provigil and it can make her birth control less effective, so I do recommend using condoms.

## 2022-04-29 NOTE — TELEPHONE ENCOUNTER
----- Message from César Son, DO sent at 4/29/2022  9:04 AM EDT -----  Please let pt know I sent in provigil and it can make her birth control less effective, so I do recommend using condoms.

## 2022-05-02 ENCOUNTER — TELEPHONE (OUTPATIENT)
Dept: FAMILY MEDICINE CLINIC | Age: 22
End: 2022-05-02

## 2022-05-02 DIAGNOSIS — H53.9 TRANSIENT VISION DISTURBANCE: ICD-10-CM

## 2022-05-02 DIAGNOSIS — R40.4 STARING EPISODES: ICD-10-CM

## 2022-05-02 DIAGNOSIS — E55.9 VITAMIN D DEFICIENCY: Primary | ICD-10-CM

## 2022-05-02 DIAGNOSIS — G47.19 EXCESSIVE DAYTIME SLEEPINESS: ICD-10-CM

## 2022-05-02 RX ORDER — ERGOCALCIFEROL 1.25 MG/1
50000 CAPSULE ORAL WEEKLY
Qty: 12 CAPSULE | Refills: 0 | Status: SHIPPED | OUTPATIENT
Start: 2022-05-02 | End: 2022-07-18

## 2022-05-02 NOTE — TELEPHONE ENCOUNTER
----- Message from No Delgado DO sent at 5/2/2022 12:56 PM EDT -----  Vitamin D level was quite low, so I recommend vitamin D 50,000 units once a week. Please Rx #12 with 0 refills and recheck vitamin D 25-hydroxy in 3 months. Folic acid is towards the low end also so I recommend taking a prenatal vitamin. Other labs looked okay. With her symptoms I recommend brain mri with/without contrast. Also has neuro referral and sleep study pending.

## 2022-05-09 RX ORDER — BUPROPION HYDROCHLORIDE 150 MG/1
150 TABLET ORAL DAILY
Qty: 30 TABLET | Refills: 5 | Status: SHIPPED | OUTPATIENT
Start: 2022-05-09

## 2022-06-15 RX ORDER — ACETAMINOPHEN AND CODEINE PHOSPHATE 120; 12 MG/5ML; MG/5ML
1 SOLUTION ORAL DAILY
Qty: 84 TABLET | Refills: 1 | Status: SHIPPED | OUTPATIENT
Start: 2022-06-15

## 2022-06-15 NOTE — TELEPHONE ENCOUNTER
Last OV: 4/29/2022 sleepiness  Last RX:    Next scheduled apt: Visit date not found          Pt requesting a refill

## 2022-07-06 ENCOUNTER — TELEPHONE (OUTPATIENT)
Dept: FAMILY MEDICINE CLINIC | Age: 22
End: 2022-07-06

## 2022-07-06 NOTE — TELEPHONE ENCOUNTER
Received notice from neurology that they could not reach patient regarding referral about her sleepiness and possible seizure activity. Please see what happened, if there was some kind of mistake in her contact info or if she would prefer to be referred somewhere else.

## 2022-07-17 DIAGNOSIS — E55.9 VITAMIN D DEFICIENCY: ICD-10-CM

## 2022-07-18 RX ORDER — ERGOCALCIFEROL 1.25 MG/1
CAPSULE ORAL
Qty: 12 CAPSULE | Refills: 0 | Status: SHIPPED | OUTPATIENT
Start: 2022-07-18 | End: 2022-10-05

## 2022-10-05 DIAGNOSIS — E55.9 VITAMIN D DEFICIENCY: ICD-10-CM

## 2022-10-05 RX ORDER — ERGOCALCIFEROL 1.25 MG/1
CAPSULE ORAL
Qty: 12 CAPSULE | Refills: 0 | Status: SHIPPED | OUTPATIENT
Start: 2022-10-05

## 2022-10-05 NOTE — TELEPHONE ENCOUNTER
Last visit:  4/29/2022  Next Visit Date:  No future appointments. Medication List:  Prior to Admission medications    Medication Sig Start Date End Date Taking?  Authorizing Provider   vitamin D (ERGOCALCIFEROL) 1.25 MG (03246 UT) CAPS capsule take 1 capsule by mouth every week 7/18/22   [place] Other, DO   norethindrone (MICRONOR) 0.35 MG tablet Take 1 tablet by mouth daily 6/15/22   [place] Other, DO   buPROPion (WELLBUTRIN XL) 150 MG extended release tablet Take 1 tablet by mouth daily 5/9/22   [place] Other, DO   albuterol sulfate HFA (VENTOLIN HFA) 108 (90 Base) MCG/ACT inhaler Inhale 2 puffs into the lungs every 6 hours as needed for Wheezing or Shortness of Breath 9/2/21   [place] Other, DO   hydrOXYzine (ATARAX) 25 MG tablet Take 1 tablet by mouth every 8 hours as needed for Anxiety 12/31/20   [place] Other, DO   acetaminophen (TYLENOL) 325 MG tablet Take 650 mg by mouth every 6 hours as needed for Pain     Historical Provider, MD

## 2022-12-01 RX ORDER — NORETHINDRONE 0.35 MG/1
TABLET ORAL
Qty: 84 TABLET | Refills: 1 | Status: SHIPPED | OUTPATIENT
Start: 2022-12-01

## 2022-12-25 DIAGNOSIS — E55.9 VITAMIN D DEFICIENCY: ICD-10-CM

## 2022-12-27 RX ORDER — ERGOCALCIFEROL 1.25 MG/1
CAPSULE ORAL
Qty: 12 CAPSULE | Refills: 0 | OUTPATIENT
Start: 2022-12-27

## 2023-01-17 ENCOUNTER — OFFICE VISIT (OUTPATIENT)
Dept: FAMILY MEDICINE CLINIC | Age: 23
End: 2023-01-17
Payer: COMMERCIAL

## 2023-01-17 VITALS
DIASTOLIC BLOOD PRESSURE: 80 MMHG | OXYGEN SATURATION: 99 % | WEIGHT: 185 LBS | HEART RATE: 92 BPM | BODY MASS INDEX: 29.03 KG/M2 | SYSTOLIC BLOOD PRESSURE: 120 MMHG | HEIGHT: 67 IN

## 2023-01-17 DIAGNOSIS — G43.109 MIGRAINE WITH AURA AND WITHOUT STATUS MIGRAINOSUS, NOT INTRACTABLE: ICD-10-CM

## 2023-01-17 DIAGNOSIS — G47.19 EXCESSIVE DAYTIME SLEEPINESS: Primary | ICD-10-CM

## 2023-01-17 DIAGNOSIS — R40.4 STARING EPISODES: ICD-10-CM

## 2023-01-17 PROCEDURE — 99214 OFFICE O/P EST MOD 30 MIN: CPT | Performed by: FAMILY MEDICINE

## 2023-01-17 RX ORDER — RIMEGEPANT SULFATE 75 MG/75MG
1 TABLET, ORALLY DISINTEGRATING ORAL
Qty: 2 TABLET | Refills: 0
Start: 2023-01-17 | End: 2023-01-17

## 2023-01-17 RX ORDER — SUMATRIPTAN 50 MG/1
50 TABLET, FILM COATED ORAL
Qty: 9 TABLET | Refills: 3 | Status: SHIPPED | OUTPATIENT
Start: 2023-01-17 | End: 2023-01-17

## 2023-01-17 ASSESSMENT — PATIENT HEALTH QUESTIONNAIRE - PHQ9
6. FEELING BAD ABOUT YOURSELF - OR THAT YOU ARE A FAILURE OR HAVE LET YOURSELF OR YOUR FAMILY DOWN: 0
SUM OF ALL RESPONSES TO PHQ9 QUESTIONS 1 & 2: 4
SUM OF ALL RESPONSES TO PHQ QUESTIONS 1-9: 10
SUM OF ALL RESPONSES TO PHQ QUESTIONS 1-9: 10
3. TROUBLE FALLING OR STAYING ASLEEP: 2
2. FEELING DOWN, DEPRESSED OR HOPELESS: 2
5. POOR APPETITE OR OVEREATING: 0
SUM OF ALL RESPONSES TO PHQ QUESTIONS 1-9: 10
7. TROUBLE CONCENTRATING ON THINGS, SUCH AS READING THE NEWSPAPER OR WATCHING TELEVISION: 1
8. MOVING OR SPEAKING SO SLOWLY THAT OTHER PEOPLE COULD HAVE NOTICED. OR THE OPPOSITE, BEING SO FIGETY OR RESTLESS THAT YOU HAVE BEEN MOVING AROUND A LOT MORE THAN USUAL: 0
9. THOUGHTS THAT YOU WOULD BE BETTER OFF DEAD, OR OF HURTING YOURSELF: 0
4. FEELING TIRED OR HAVING LITTLE ENERGY: 3
SUM OF ALL RESPONSES TO PHQ QUESTIONS 1-9: 10
10. IF YOU CHECKED OFF ANY PROBLEMS, HOW DIFFICULT HAVE THESE PROBLEMS MADE IT FOR YOU TO DO YOUR WORK, TAKE CARE OF THINGS AT HOME, OR GET ALONG WITH OTHER PEOPLE: 0
1. LITTLE INTEREST OR PLEASURE IN DOING THINGS: 2

## 2023-01-17 NOTE — PROGRESS NOTES
Name: Herson Mondragon  : 2000         Chief Complaint:     Chief Complaint   Patient presents with    Headache     Headaches almost daily, still \"zoning\" out and constantly tired. History of Present Illness:      Herson Mondragon is a 23 y.o.  female who presents with Headache (Headaches almost daily, still \"zoning\" out and constantly tired. )      HPI    Episodically zoning out for a couple seconds, hears muffled speech, comes right back. More when tired. Fiance only notices her doing it when she's on tiktok, but pt says it happens at work and otherwise. Still/again very tired. Sleeps 7-8h. No abnl movements during sleep per fiance - very occasionally will stretch arms above head and stroke inner forearm, which is something she tends to like for him to do. Headaches more frequent, similar in character to previous but worse. Few nights ago had a very bad one, bad nausea. Always light sensitive with them. Pain usually R sided. Used to be just pressure but that night felt like stabbing. May go a stretch of daily-QOD for a couple wks and then have none for a couple wks. Edgy mood, set off easily. Extra stress d/t wedding planning. Continues wellbutrin. Medical History:     Patient Active Problem List   Diagnosis    Migraine with aura and without status migrainosus, not intractable    Primary dysmenorrhea       Medications:       Prior to Admission medications    Medication Sig Start Date End Date Taking?  Authorizing Provider   Rimegepant Sulfate (NURTEC) 75 MG TBDP Take 1 tablet by mouth once as needed (migraine) Sample given ndc [address]-1, lot [id number], exp 23 Yes Brandin Nagy, DO   SUMAtriptan (IMITREX) 50 MG tablet Take 1 tablet by mouth once as needed for Migraine 23 Yes Brandin Nagy DO   JENCYCLA 0.35 MG tablet take 1 tablet by mouth daily 22  Yes Brandin Nagy DO   vitamin D (ERGOCALCIFEROL) 1.25 MG (57344 UT) CAPS capsule take 1 capsule by mouth every week 10/5/22  Yes Omega Degroot DO   buPROPion (WELLBUTRIN XL) 150 MG extended release tablet Take 1 tablet by mouth daily 5/9/22  Yes Omega Degroot DO   albuterol sulfate HFA (VENTOLIN HFA) 108 (90 Base) MCG/ACT inhaler Inhale 2 puffs into the lungs every 6 hours as needed for Wheezing or Shortness of Breath 9/2/21  Yes Omega Degroot DO   hydrOXYzine (ATARAX) 25 MG tablet Take 1 tablet by mouth every 8 hours as needed for Anxiety 12/31/20  Yes Omega Degroot DO   acetaminophen (TYLENOL) 325 MG tablet Take 650 mg by mouth every 6 hours as needed for Pain    Yes Historical Provider, MD        Allergies:       Seasonal    Physical Exam:     Vitals:  /80   Pulse 92   Ht 5' 7\" (1.702 m)   Wt 185 lb (83.9 kg)   SpO2 99%   BMI 28.98 kg/m²   Physical Exam  Vitals and nursing note reviewed. Constitutional:       General: She is not in acute distress. Appearance: She is well-developed. HENT:      Head: Normocephalic and atraumatic. Right Ear: Tympanic membrane and ear canal normal.      Left Ear: Tympanic membrane and ear canal normal.      Nose: Nose normal.      Mouth/Throat:      Mouth: Mucous membranes are moist.      Pharynx: Oropharynx is clear. Eyes:      Conjunctiva/sclera: Conjunctivae normal.   Cardiovascular:      Rate and Rhythm: Normal rate and regular rhythm. Heart sounds: Normal heart sounds. Pulmonary:      Effort: Pulmonary effort is normal.      Breath sounds: Normal breath sounds. Musculoskeletal:      Cervical back: Neck supple. Lymphadenopathy:      Cervical: No cervical adenopathy. Skin:     General: Skin is warm and dry. Neurological:      Mental Status: She is alert and oriented to person, place, and time.    Psychiatric:         Mood and Affect: Mood normal.         Behavior: Behavior normal.       Data:     Lab Results   Component Value Date/Time     04/29/2022 07:39 AM    K 4.3 04/29/2022 07:39 AM     04/29/2022 07:39 AM    CO2 26 04/29/2022 07:39 AM    BUN 10 04/29/2022 07:39 AM    CREATININE 0.79 04/29/2022 07:39 AM    GLUCOSE 99 04/29/2022 07:39 AM    PROT 7.4 04/29/2022 07:39 AM    LABALBU 4.6 04/29/2022 07:39 AM    BILITOT 0.57 04/29/2022 07:39 AM    ALKPHOS 118 04/29/2022 07:39 AM    AST 20 04/29/2022 07:39 AM    ALT 19 04/29/2022 07:39 AM     Lab Results   Component Value Date/Time    WBC 6.5 04/29/2022 07:39 AM    RBC 5.18 04/29/2022 07:39 AM    HGB 14.6 04/29/2022 07:39 AM    HCT 43.7 04/29/2022 07:39 AM    MCV 84.4 04/29/2022 07:39 AM    MCH 28.1 04/29/2022 07:39 AM    MCHC 33.3 04/29/2022 07:39 AM    RDW 12.9 04/29/2022 07:39 AM     04/29/2022 07:39 AM    MPV NOT REPORTED 09/02/2020 03:25 PM     Lab Results   Component Value Date/Time    TSH 2.89 04/29/2022 07:39 AM     Lab Results   Component Value Date/Time    LABA1C 4.8 12/16/2016 10:56 AM         Assessment & Plan:        Diagnosis Orders   1. Excessive daytime sleepiness  Baseline Diagnostic Sleep Study    Assessment of Daytime Sleepiness      2. Staring episodes  Baseline Diagnostic Sleep Study    Assessment of Daytime Sleepiness      3. Migraine with aura and without status migrainosus, not intractable          EDS despite reportedly adequate and sound sleep. Reordered sleep study along with MSLT. Suspect absence seizures though narcolepsy another consideration. Worse migraines - restart imitrex, may also try nurtec.     Requested Prescriptions     Signed Prescriptions Disp Refills    Rimegepant Sulfate (NURTEC) 75 MG TBDP 2 tablet 0     Sig: Take 1 tablet by mouth once as needed (migraine) Sample given ndc [address]-3, lot [id number], exp 2/25    SUMAtriptan (IMITREX) 50 MG tablet 9 tablet 3     Sig: Take 1 tablet by mouth once as needed for Migraine         There are no Patient Instructions on file for this visit.      signed by César Khoury DO on 1/17/2023 at [facility] [address]  [address] [address] [facility] [address]  Dept: [phone]

## 2023-01-31 ENCOUNTER — PATIENT MESSAGE (OUTPATIENT)
Dept: FAMILY MEDICINE CLINIC | Age: 23
End: 2023-01-31

## 2023-02-01 NOTE — TELEPHONE ENCOUNTER
From: Zachery Martinez  To: Dr. Rivas Current: 1/31/2023 8:22 PM EST  Subject: Sleep test    Hello, I received a letter stating that my insurance needs more information on why I need to have the sleep study done. I wasnt sure what I needed to do. I called and left a voicemail about which insurance I have and I never heard back.

## 2023-02-14 RX ORDER — RIMEGEPANT SULFATE 75 MG/75MG
75 TABLET, ORALLY DISINTEGRATING ORAL
Qty: 9 TABLET | Refills: 5 | Status: SHIPPED | OUTPATIENT
Start: 2023-02-14 | End: 2023-02-14

## 2023-04-15 SDOH — ECONOMIC STABILITY: TRANSPORTATION INSECURITY
IN THE PAST 12 MONTHS, HAS LACK OF TRANSPORTATION KEPT YOU FROM MEETINGS, WORK, OR FROM GETTING THINGS NEEDED FOR DAILY LIVING?: NO

## 2023-04-15 SDOH — ECONOMIC STABILITY: HOUSING INSECURITY
IN THE LAST 12 MONTHS, WAS THERE A TIME WHEN YOU DID NOT HAVE A STEADY PLACE TO SLEEP OR SLEPT IN A SHELTER (INCLUDING NOW)?: NO

## 2023-04-15 SDOH — ECONOMIC STABILITY: FOOD INSECURITY: WITHIN THE PAST 12 MONTHS, THE FOOD YOU BOUGHT JUST DIDN'T LAST AND YOU DIDN'T HAVE MONEY TO GET MORE.: NEVER TRUE

## 2023-04-15 SDOH — ECONOMIC STABILITY: FOOD INSECURITY: WITHIN THE PAST 12 MONTHS, YOU WORRIED THAT YOUR FOOD WOULD RUN OUT BEFORE YOU GOT MONEY TO BUY MORE.: NEVER TRUE

## 2023-04-15 SDOH — ECONOMIC STABILITY: INCOME INSECURITY: HOW HARD IS IT FOR YOU TO PAY FOR THE VERY BASICS LIKE FOOD, HOUSING, MEDICAL CARE, AND HEATING?: SOMEWHAT HARD

## 2023-04-17 ENCOUNTER — OFFICE VISIT (OUTPATIENT)
Dept: FAMILY MEDICINE CLINIC | Age: 23
End: 2023-04-17
Payer: COMMERCIAL

## 2023-04-17 VITALS
DIASTOLIC BLOOD PRESSURE: 62 MMHG | OXYGEN SATURATION: 98 % | SYSTOLIC BLOOD PRESSURE: 120 MMHG | BODY MASS INDEX: 28.72 KG/M2 | HEART RATE: 94 BPM | WEIGHT: 183 LBS | HEIGHT: 67 IN

## 2023-04-17 DIAGNOSIS — G43.109 MIGRAINE WITH AURA AND WITHOUT STATUS MIGRAINOSUS, NOT INTRACTABLE: ICD-10-CM

## 2023-04-17 DIAGNOSIS — F98.8 ATTENTION DEFICIT DISORDER (ADD) IN ADULT: Primary | ICD-10-CM

## 2023-04-17 DIAGNOSIS — R40.4 STARING EPISODES: ICD-10-CM

## 2023-04-17 PROCEDURE — 99214 OFFICE O/P EST MOD 30 MIN: CPT | Performed by: FAMILY MEDICINE

## 2023-04-17 RX ORDER — ACETAMINOPHEN AND CODEINE PHOSPHATE 120; 12 MG/5ML; MG/5ML
1 SOLUTION ORAL DAILY
Qty: 84 TABLET | Refills: 1 | Status: SHIPPED | OUTPATIENT
Start: 2023-04-17

## 2023-04-17 RX ORDER — METHYLPHENIDATE HYDROCHLORIDE 20 MG/1
20 CAPSULE, EXTENDED RELEASE ORAL DAILY
Qty: 30 CAPSULE | Refills: 0 | Status: SHIPPED | OUTPATIENT
Start: 2023-04-17 | End: 2023-05-17

## 2023-04-17 RX ORDER — ALBUTEROL SULFATE 90 UG/1
2 AEROSOL, METERED RESPIRATORY (INHALATION) EVERY 6 HOURS PRN
Qty: 1 EACH | Refills: 5 | Status: SHIPPED | OUTPATIENT
Start: 2023-04-17

## 2023-04-17 RX ORDER — RIZATRIPTAN BENZOATE 10 MG/1
10 TABLET ORAL
Qty: 30 TABLET | Refills: 3 | Status: SHIPPED | OUTPATIENT
Start: 2023-04-17 | End: 2023-04-17

## 2023-04-17 NOTE — PATIENT INSTRUCTIONS
SURVEY:    You may be receiving a survey from [email or web address] regarding your visit today. You may get this in the mail, through your MyChart or in your email. Please complete the survey to enable us to provide the highest quality of care to you and your family. If you cannot score us as very good ( 5 Stars) on any question, please feel free to call the office to discuss how we could have made your experience exceptional.     Thank you.     Clinical Care Team:  DO Yaneth Boateng, [address] [place] [address] Team:  [address] [place] [address]

## 2023-04-17 NOTE — PROGRESS NOTES
SOB albuterol sulfate HFA (VENTOLIN HFA) 108 (90 Base) MCG/ACT inhaler 1 each 5     Sig: Inhale 2 puffs into the lungs every 6 hours as needed for Wheezing or Shortness of Breath    norethindrone (JENCYCLA) 0.35 MG tablet 84 tablet 1     Sig: Take 1 tablet by mouth daily    rizatriptan (MAXALT) 10 MG tablet 30 tablet 3     Sig: Take 1 tablet by mouth once as needed for Migraine May repeat in 2 hours if needed    methylphenidate (RITALIN LA) 20 MG extended release capsule 30 capsule 0     Sig: Take 1 capsule by mouth daily for 30 days. Max Daily Amount: 20 mg         Patient Instructions   SURVEY:    You may be receiving a survey from [employer] regarding your visit today. You may get this in the mail, through your MyChart or in your email. Please complete the survey to enable us to provide the highest quality of care to you and your family. If you cannot score us as very good ( 5 Stars) on any question, please feel free to call the office to discuss how we could have made your experience exceptional.     Thank you.     Clinical Care Team:  DO Jacek Stark, [facility] Team:  Jagruti Sepulveda        signed by Deep Chris DO on 4/17/2023 at 8:51 AM  [address]  Dept: [phone]

## 2023-05-16 ENCOUNTER — OFFICE VISIT (OUTPATIENT)
Dept: FAMILY MEDICINE CLINIC | Age: 23
End: 2023-05-16
Payer: COMMERCIAL

## 2023-05-16 VITALS
SYSTOLIC BLOOD PRESSURE: 110 MMHG | TEMPERATURE: 100 F | HEIGHT: 67 IN | DIASTOLIC BLOOD PRESSURE: 80 MMHG | OXYGEN SATURATION: 97 % | BODY MASS INDEX: 28.09 KG/M2 | HEART RATE: 124 BPM | WEIGHT: 179 LBS

## 2023-05-16 DIAGNOSIS — J06.9 VIRAL URI: ICD-10-CM

## 2023-05-16 DIAGNOSIS — G43.109 MIGRAINE WITH AURA AND WITHOUT STATUS MIGRAINOSUS, NOT INTRACTABLE: ICD-10-CM

## 2023-05-16 DIAGNOSIS — F98.8 ATTENTION DEFICIT DISORDER (ADD) IN ADULT: Primary | ICD-10-CM

## 2023-05-16 PROCEDURE — 99214 OFFICE O/P EST MOD 30 MIN: CPT | Performed by: FAMILY MEDICINE

## 2023-05-16 RX ORDER — DEXTROAMPHETAMINE SACCHARATE, AMPHETAMINE ASPARTATE MONOHYDRATE, DEXTROAMPHETAMINE SULFATE AND AMPHETAMINE SULFATE 5; 5; 5; 5 MG/1; MG/1; MG/1; MG/1
20 CAPSULE, EXTENDED RELEASE ORAL EVERY MORNING
Qty: 30 CAPSULE | Refills: 0 | Status: SHIPPED | OUTPATIENT
Start: 2023-05-16 | End: 2023-06-15

## 2023-05-22 ENCOUNTER — TELEPHONE (OUTPATIENT)
Dept: FAMILY MEDICINE CLINIC | Age: 23
End: 2023-05-22

## 2023-05-22 DIAGNOSIS — J01.10 ACUTE NON-RECURRENT FRONTAL SINUSITIS: ICD-10-CM

## 2023-05-22 RX ORDER — AMOXICILLIN AND CLAVULANATE POTASSIUM 875; 125 MG/1; MG/1
1 TABLET, FILM COATED ORAL 2 TIMES DAILY
Qty: 20 TABLET | Refills: 0 | Status: SHIPPED | OUTPATIENT
Start: 2023-05-22 | End: 2023-06-01

## 2023-05-22 NOTE — TELEPHONE ENCOUNTER
Patient called with c/o of cough, nasal congestion, and headaches. Patient stated she was seen 5/16/2023 in office and tested for Covid 5/16, 5/17, and 5/19 -- all negative home tests. Onset of symptoms started 5/14/2023. Patient denies any chest congestion and fever. Patient has tried sudafed with no relief. Patient asking if there is anything provider can send in? Patient uses [employer] in [facility]. Please advise.

## 2023-07-27 ENCOUNTER — TELEPHONE (OUTPATIENT)
Dept: FAMILY MEDICINE CLINIC | Age: 23
End: 2023-07-27

## 2023-07-27 ENCOUNTER — OFFICE VISIT (OUTPATIENT)
Dept: FAMILY MEDICINE CLINIC | Age: 23
End: 2023-07-27
Payer: COMMERCIAL

## 2023-07-27 VITALS
WEIGHT: 176 LBS | OXYGEN SATURATION: 98 % | DIASTOLIC BLOOD PRESSURE: 70 MMHG | SYSTOLIC BLOOD PRESSURE: 110 MMHG | BODY MASS INDEX: 27.62 KG/M2 | HEIGHT: 67 IN | HEART RATE: 87 BPM

## 2023-07-27 DIAGNOSIS — G43.109 MIGRAINE WITH AURA AND WITHOUT STATUS MIGRAINOSUS, NOT INTRACTABLE: Primary | ICD-10-CM

## 2023-07-27 DIAGNOSIS — F98.8 ATTENTION DEFICIT DISORDER (ADD) IN ADULT: ICD-10-CM

## 2023-07-27 DIAGNOSIS — R19.5 HARD STOOL: ICD-10-CM

## 2023-07-27 DIAGNOSIS — K62.5 RECTAL BLEEDING: ICD-10-CM

## 2023-07-27 DIAGNOSIS — F41.8 DEPRESSION WITH ANXIETY: ICD-10-CM

## 2023-07-27 PROCEDURE — 99214 OFFICE O/P EST MOD 30 MIN: CPT | Performed by: FAMILY MEDICINE

## 2023-07-27 RX ORDER — SUMATRIPTAN 50 MG/1
50 TABLET, FILM COATED ORAL
Qty: 9 TABLET | Refills: 3 | Status: SHIPPED | OUTPATIENT
Start: 2023-07-27 | End: 2023-07-27

## 2023-07-27 RX ORDER — DEXTROAMPHETAMINE SACCHARATE, AMPHETAMINE ASPARTATE MONOHYDRATE, DEXTROAMPHETAMINE SULFATE AND AMPHETAMINE SULFATE 5; 5; 5; 5 MG/1; MG/1; MG/1; MG/1
20 CAPSULE, EXTENDED RELEASE ORAL EVERY MORNING
Qty: 30 CAPSULE | Refills: 0 | Status: SHIPPED | OUTPATIENT
Start: 2023-07-27 | End: 2023-08-26

## 2023-07-27 RX ORDER — POLYETHYLENE GLYCOL 3350 17 G/17G
POWDER, FOR SOLUTION ORAL
Qty: 510 G | Refills: 2 | Status: SHIPPED | OUTPATIENT
Start: 2023-07-27

## 2023-07-27 RX ORDER — RIMEGEPANT SULFATE 75 MG/75MG
TABLET, ORALLY DISINTEGRATING ORAL
COMMUNITY
Start: 2023-05-17

## 2023-07-27 NOTE — PROGRESS NOTES
1 at home. Let me know result so we know how to manage meds. Requested Prescriptions     Signed Prescriptions Disp Refills    amphetamine-dextroamphetamine (ADDERALL XR) 20 MG extended release capsule 30 capsule 0     Sig: Take 1 capsule by mouth every morning for 30 days.  Max Daily Amount: 20 mg    SUMAtriptan (IMITREX) 50 MG tablet 9 tablet 3     Sig: Take 1 tablet by mouth once as needed for Migraine    polyethylene glycol (GLYCOLAX) 17 GM/SCOOP powder 510 g 2     Si/2 capful in at least 4 oz drink daily         signed by Annabella Acevedo DO on 2023 at 8:33 AM  [id number] [address]  [address] [place] [address]  Dept: [phone]

## 2023-07-27 NOTE — TELEPHONE ENCOUNTER
[facility] pharmacy called and would like to clarify the directions on the medication Imitrex. How many tablet can pt take in one day?  Please advise

## 2023-07-27 NOTE — PATIENT INSTRUCTIONS
[employer] SURVEY:    You may be receiving a survey from [employer] regarding your visit today. You may get this in the mail, through your MyChart or in your email. Please complete the survey to enable us to provide the highest quality of care to you and your family. If you cannot score us as very good ( 5 Stars) on any question, please feel free to call the office to discuss how we could have made your experience exceptional.     Thank you.     Clinical Care Team:   Dr. Ginger Callahan, [facility], [address]                                     Triage: Carmen Thomason, [address] [facility] Team:    [address] [facility] [place]

## 2023-08-26 DIAGNOSIS — F98.8 ATTENTION DEFICIT DISORDER (ADD) IN ADULT: ICD-10-CM

## 2023-08-28 RX ORDER — DEXTROAMPHETAMINE SACCHARATE, AMPHETAMINE ASPARTATE MONOHYDRATE, DEXTROAMPHETAMINE SULFATE AND AMPHETAMINE SULFATE 5; 5; 5; 5 MG/1; MG/1; MG/1; MG/1
20 CAPSULE, EXTENDED RELEASE ORAL EVERY MORNING
Qty: 30 CAPSULE | Refills: 0 | Status: SHIPPED | OUTPATIENT
Start: 2023-08-28 | End: 2023-09-27

## 2023-08-28 NOTE — TELEPHONE ENCOUNTER
Last OV: 7/27/2023  chronic  05/16/23 ADD  Last RX:    Next scheduled apt: 10/30/2023 ADD             Pt requesting a refill

## 2023-10-05 RX ORDER — BUPROPION HYDROCHLORIDE 150 MG/1
150 TABLET ORAL DAILY
Qty: 30 TABLET | Refills: 5 | Status: SHIPPED | OUTPATIENT
Start: 2023-10-05

## 2023-10-05 NOTE — TELEPHONE ENCOUNTER
Last visit:  7/27/2023  Next Visit Date:    Future Appointments   Date Time Provider [address]   10/30/2023  8:00 AM DO Ana Bernardo [facility]         Medication List:  Prior to Admission medications    Medication Sig Start Date End Date Taking? Authorizing Provider   amphetamine-dextroamphetamine (ADDERALL XR) 20 MG extended release capsule Take 1 capsule by mouth every morning for 30 days.  Max Daily Amount: 20 mg 8/28/23 9/27/23  Gaye LYNCH DO   NURTEC 75 MG TBDP LET DISSOLVE ON TONGUE 1 TABLET BY MOUTH ONCE AS NEEDED (MIGRAINE) 5/17/23   ProviderModesta MD   SUMAtriptan (IMITREX) 50 MG tablet Take 1 tablet by mouth once as needed for Migraine 7/27/23 7/27/23  Moreno Velez DO   polyethylene glycol (GLYCOLAX) 17 GM/SCOOP powder 1/2 capful in at least 4 oz drink daily 7/27/23   Gaye LYNCH DO   albuterol sulfate HFA (VENTOLIN HFA) 108 (90 Base) MCG/ACT inhaler Inhale 2 puffs into the lungs every 6 hours as needed for Wheezing or Shortness of Breath 4/17/23   Moreno Velez DO   norethindrone (JENCYCLA) 0.35 MG tablet Take 1 tablet by mouth daily 4/17/23   Moreno Velez DO   buPROPion (WELLBUTRIN XL) 150 MG extended release tablet take 1 tablet by mouth daily 4/10/23   Irvin Cox MD   vitamin D (ERGOCALCIFEROL) 1.25 MG (80896 UT) CAPS capsule take 1 capsule by mouth every week 10/5/22   Moreno Velez DO   hydrOXYzine (ATARAX) 25 MG tablet Take 1 tablet by mouth every 8 hours as needed for Anxiety 12/31/20   Moreno Velez DO   acetaminophen (TYLENOL) 325 MG tablet Take 650 mg by mouth every 6 hours as needed for Pain     ProviderModesta MD

## 2023-10-30 ENCOUNTER — OFFICE VISIT (OUTPATIENT)
Dept: FAMILY MEDICINE CLINIC | Age: 23
End: 2023-10-30
Payer: COMMERCIAL

## 2023-10-30 VITALS
HEART RATE: 72 BPM | WEIGHT: 176 LBS | SYSTOLIC BLOOD PRESSURE: 104 MMHG | HEIGHT: 67 IN | OXYGEN SATURATION: 98 % | BODY MASS INDEX: 27.62 KG/M2 | DIASTOLIC BLOOD PRESSURE: 64 MMHG

## 2023-10-30 DIAGNOSIS — F41.8 DEPRESSION WITH ANXIETY: ICD-10-CM

## 2023-10-30 DIAGNOSIS — F98.8 ATTENTION DEFICIT DISORDER (ADD) IN ADULT: Primary | ICD-10-CM

## 2023-10-30 PROCEDURE — 99213 OFFICE O/P EST LOW 20 MIN: CPT | Performed by: FAMILY MEDICINE

## 2023-10-30 RX ORDER — BUPROPION HYDROCHLORIDE 300 MG/1
300 TABLET ORAL DAILY
Qty: 90 TABLET | Refills: 1 | Status: SHIPPED | OUTPATIENT
Start: 2023-10-30

## 2023-10-30 NOTE — PATIENT INSTRUCTIONS
Press [place] SURVEY:    You may be receiving a survey from [employer] regarding your visit today. You may get this in the mail, through your MyChart or in your email. Please complete the survey to enable us to provide the highest quality of care to you and your family. If you cannot score us as very good ( 5 Stars) on any question, please feel free to call the office to discuss how we could have made your experience exceptional.     Thank you.     Clinical Care Team:   Dr. Bam Hand, [address] [facility], [address]                                     Triage: Wichita Drown, [address] [facility] Team:    [address] [facility]

## 2023-11-16 ENCOUNTER — TELEPHONE (OUTPATIENT)
Dept: OBGYN | Age: 23
End: 2023-11-16

## 2023-11-16 NOTE — TELEPHONE ENCOUNTER
Patient called and stated this was her first pregnancy and she was having a lot of cramping the last 2 days. Attempted to call patient back, no voicemail set up.

## 2023-11-27 ENCOUNTER — HOSPITAL ENCOUNTER (OUTPATIENT)
Age: 23
Setting detail: SPECIMEN
Discharge: HOME | End: 2023-11-27
Payer: COMMERCIAL

## 2023-11-27 ENCOUNTER — INITIAL PRENATAL (OUTPATIENT)
Dept: OBGYN | Age: 23
End: 2023-11-27
Payer: COMMERCIAL

## 2023-11-27 VITALS — BODY MASS INDEX: 27.25 KG/M2 | WEIGHT: 174 LBS | SYSTOLIC BLOOD PRESSURE: 106 MMHG | DIASTOLIC BLOOD PRESSURE: 64 MMHG

## 2023-11-27 DIAGNOSIS — Z3A.01 6 WEEKS GESTATION OF PREGNANCY: ICD-10-CM

## 2023-11-27 DIAGNOSIS — N91.2 AMENORRHEA: ICD-10-CM

## 2023-11-27 DIAGNOSIS — Z34.01 ENCOUNTER FOR SUPERVISION OF NORMAL FIRST PREGNANCY IN FIRST TRIMESTER: Primary | ICD-10-CM

## 2023-11-27 DIAGNOSIS — Z34.01 ENCOUNTER FOR SUPERVISION OF NORMAL FIRST PREGNANCY IN FIRST TRIMESTER: ICD-10-CM

## 2023-11-27 LAB
ABO + RH BLD: NORMAL
BLOOD GROUP ANTIBODIES SERPL: NEGATIVE
CONTROL: PRESENT
HCV AB SERPL QL IA: NONREACTIVE
HIV 1+2 AB+HIV1 P24 AG SERPL QL IA: NONREACTIVE
PREGNANCY TEST URINE, POC: POSITIVE

## 2023-11-27 PROCEDURE — 87389 HIV-1 AG W/HIV-1&-2 AB AG IA: CPT

## 2023-11-27 PROCEDURE — 85025 COMPLETE CBC W/AUTO DIFF WBC: CPT

## 2023-11-27 PROCEDURE — 0500F INITIAL PRENATAL CARE VISIT: CPT | Performed by: ADVANCED PRACTICE MIDWIFE

## 2023-11-27 PROCEDURE — 86850 RBC ANTIBODY SCREEN: CPT

## 2023-11-27 PROCEDURE — 86901 BLOOD TYPING SEROLOGIC RH(D): CPT

## 2023-11-27 PROCEDURE — 87340 HEPATITIS B SURFACE AG IA: CPT

## 2023-11-27 PROCEDURE — 87491 CHLMYD TRACH DNA AMP PROBE: CPT

## 2023-11-27 PROCEDURE — 86900 BLOOD TYPING SEROLOGIC ABO: CPT

## 2023-11-27 PROCEDURE — 86780 TREPONEMA PALLIDUM: CPT

## 2023-11-27 PROCEDURE — 81025 URINE PREGNANCY TEST: CPT | Performed by: ADVANCED PRACTICE MIDWIFE

## 2023-11-27 PROCEDURE — 87086 URINE CULTURE/COLONY COUNT: CPT

## 2023-11-27 PROCEDURE — 86762 RUBELLA ANTIBODY: CPT

## 2023-11-27 PROCEDURE — 36415 COLL VENOUS BLD VENIPUNCTURE: CPT | Performed by: ADVANCED PRACTICE MIDWIFE

## 2023-11-27 PROCEDURE — 87591 N.GONORRHOEAE DNA AMP PROB: CPT

## 2023-11-27 PROCEDURE — 86803 HEPATITIS C AB TEST: CPT

## 2023-11-27 RX ORDER — OMEPRAZOLE 20 MG/1
20 CAPSULE, DELAYED RELEASE ORAL
Qty: 30 CAPSULE | Refills: 5 | Status: SHIPPED | OUTPATIENT
Start: 2023-11-27

## 2023-11-28 LAB
BASOPHILS # BLD: 0.04 K/UL (ref 0–0.2)
BASOPHILS NFR BLD: 0 % (ref 0–2)
CHLAMYDIA DNA UR QL NAA+PROBE: NEGATIVE
EOSINOPHIL # BLD: 0.07 K/UL (ref 0–0.44)
EOSINOPHILS RELATIVE PERCENT: 1 % (ref 1–4)
ERYTHROCYTE [DISTWIDTH] IN BLOOD BY AUTOMATED COUNT: 12.2 % (ref 11.8–14.4)
HBV SURFACE AG SERPL QL IA: NONREACTIVE
HCT VFR BLD AUTO: 41.9 % (ref 36.3–47.1)
HGB BLD-MCNC: 14.3 G/DL (ref 11.9–15.1)
IMM GRANULOCYTES # BLD AUTO: 0.03 K/UL (ref 0–0.3)
IMM GRANULOCYTES NFR BLD: 0 %
LYMPHOCYTES NFR BLD: 1.51 K/UL (ref 1.1–3.7)
LYMPHOCYTES RELATIVE PERCENT: 15 % (ref 24–43)
MCH RBC QN AUTO: 29.5 PG (ref 25.2–33.5)
MCHC RBC AUTO-ENTMCNC: 34.1 G/DL (ref 28.4–34.8)
MCV RBC AUTO: 86.4 FL (ref 82.6–102.9)
MICROORGANISM SPEC CULT: NORMAL
MONOCYTES NFR BLD: 0.55 K/UL (ref 0.1–1.2)
MONOCYTES NFR BLD: 5 % (ref 3–12)
N GONORRHOEA DNA UR QL NAA+PROBE: NEGATIVE
NEUTROPHILS NFR BLD: 79 % (ref 36–65)
NEUTS SEG NFR BLD: 8.04 K/UL (ref 1.5–8.1)
NRBC BLD-RTO: 0 PER 100 WBC
PLATELET # BLD AUTO: 249 K/UL (ref 138–453)
PMV BLD AUTO: 9.4 FL (ref 8.1–13.5)
RBC # BLD AUTO: 4.85 M/UL (ref 3.95–5.11)
RUBV IGG SERPL QL IA: 20.18 IU/ML
SPECIMEN DESCRIPTION: NORMAL
SPECIMEN DESCRIPTION: NORMAL
T PALLIDUM AB SER QL IA: NONREACTIVE
WBC OTHER # BLD: 10.2 K/UL (ref 3.5–11.3)

## 2023-12-14 ENCOUNTER — ROUTINE PRENATAL (OUTPATIENT)
Dept: OBGYN | Age: 23
End: 2023-12-14

## 2023-12-14 VITALS
DIASTOLIC BLOOD PRESSURE: 72 MMHG | WEIGHT: 171.2 LBS | BODY MASS INDEX: 26.81 KG/M2 | SYSTOLIC BLOOD PRESSURE: 114 MMHG | HEART RATE: 82 BPM

## 2023-12-14 DIAGNOSIS — Z34.01 ENCOUNTER FOR SUPERVISION OF NORMAL FIRST PREGNANCY IN FIRST TRIMESTER: ICD-10-CM

## 2023-12-14 DIAGNOSIS — R12 HEARTBURN DURING PREGNANCY IN FIRST TRIMESTER: ICD-10-CM

## 2023-12-14 DIAGNOSIS — O99.611 CONSTIPATION DURING PREGNANCY IN FIRST TRIMESTER: ICD-10-CM

## 2023-12-14 DIAGNOSIS — Z3A.09 9 WEEKS GESTATION OF PREGNANCY: Primary | ICD-10-CM

## 2023-12-14 DIAGNOSIS — O26.891 HEARTBURN DURING PREGNANCY IN FIRST TRIMESTER: ICD-10-CM

## 2023-12-14 DIAGNOSIS — K59.00 CONSTIPATION DURING PREGNANCY IN FIRST TRIMESTER: ICD-10-CM

## 2024-01-03 ENCOUNTER — OFFICE VISIT (OUTPATIENT)
Dept: FAMILY MEDICINE CLINIC | Age: 24
End: 2024-01-03

## 2024-01-03 VITALS
HEART RATE: 86 BPM | DIASTOLIC BLOOD PRESSURE: 66 MMHG | SYSTOLIC BLOOD PRESSURE: 104 MMHG | BODY MASS INDEX: 28.72 KG/M2 | OXYGEN SATURATION: 99 % | WEIGHT: 183 LBS | HEIGHT: 67 IN

## 2024-01-03 DIAGNOSIS — O21.9 NAUSEA AND VOMITING DURING PREGNANCY: ICD-10-CM

## 2024-01-03 DIAGNOSIS — O26.891 HEARTBURN DURING PREGNANCY IN FIRST TRIMESTER: ICD-10-CM

## 2024-01-03 DIAGNOSIS — F41.8 DEPRESSION WITH ANXIETY: Primary | ICD-10-CM

## 2024-01-03 DIAGNOSIS — R12 HEARTBURN DURING PREGNANCY IN FIRST TRIMESTER: ICD-10-CM

## 2024-01-03 ASSESSMENT — PATIENT HEALTH QUESTIONNAIRE - PHQ9
1. LITTLE INTEREST OR PLEASURE IN DOING THINGS: 0
4. FEELING TIRED OR HAVING LITTLE ENERGY: 0
9. THOUGHTS THAT YOU WOULD BE BETTER OFF DEAD, OR OF HURTING YOURSELF: 0
3. TROUBLE FALLING OR STAYING ASLEEP: 0
SUM OF ALL RESPONSES TO PHQ QUESTIONS 1-9: 0
8. MOVING OR SPEAKING SO SLOWLY THAT OTHER PEOPLE COULD HAVE NOTICED. OR THE OPPOSITE, BEING SO FIGETY OR RESTLESS THAT YOU HAVE BEEN MOVING AROUND A LOT MORE THAN USUAL: 0
6. FEELING BAD ABOUT YOURSELF - OR THAT YOU ARE A FAILURE OR HAVE LET YOURSELF OR YOUR FAMILY DOWN: 0
SUM OF ALL RESPONSES TO PHQ QUESTIONS 1-9: 0
10. IF YOU CHECKED OFF ANY PROBLEMS, HOW DIFFICULT HAVE THESE PROBLEMS MADE IT FOR YOU TO DO YOUR WORK, TAKE CARE OF THINGS AT HOME, OR GET ALONG WITH OTHER PEOPLE: 0
5. POOR APPETITE OR OVEREATING: 0
7. TROUBLE CONCENTRATING ON THINGS, SUCH AS READING THE NEWSPAPER OR WATCHING TELEVISION: 0
SUM OF ALL RESPONSES TO PHQ QUESTIONS 1-9: 0
2. FEELING DOWN, DEPRESSED OR HOPELESS: 0
SUM OF ALL RESPONSES TO PHQ QUESTIONS 1-9: 0
SUM OF ALL RESPONSES TO PHQ9 QUESTIONS 1 & 2: 0

## 2024-01-03 NOTE — PROGRESS NOTES
Name: Dayan Anders  : 2000         Chief Complaint:     Chief Complaint   Patient presents with    ADD       History of Present Illness:      Dayan Anders is a 23 y.o.  female who presents with ADD      HPI    12w3d pregnant and having a lot of nausea and heartburn. No food sounds good. Does try to eat on reg basis and doesn't necessarily feel worse after eating.     Mood not great, crying a lot and feeling poorly. Much guilt about her limitations at work r/t pregnancy even though coworkers have been accommodating. Has been doing counseling through [facility]. Has only been able to do one intro session so far. Continues wellbutrin 300 mg daily.     Medical History:     Patient Active Problem List   Diagnosis    Migraine with aura and without status migrainosus, not intractable    Primary dysmenorrhea       Medications:       Prior to Admission medications    Medication Sig Start Date End Date Taking? Authorizing Provider   Prenatal MV-Min-Fe Fum-FA-DHA (PRENATAL 1 PO) Take by mouth   Yes Provider, Historical, MD   omeprazole (PRILOSEC) 20 MG delayed release capsule Take 1 capsule by mouth every morning (before breakfast) 23  Yes Glendy Trinh APRN - CNM   buPROPion (WELLBUTRIN XL) 300 MG extended release tablet Take 1 tablet by mouth daily 10/30/23  Yes Milly Leblanc DO   albuterol sulfate HFA (VENTOLIN HFA) 108 (90 Base) MCG/ACT inhaler Inhale 2 puffs into the lungs every 6 hours as needed for Wheezing or Shortness of Breath 23  Yes Milly Leblanc DO   acetaminophen (TYLENOL) 325 MG tablet Take 2 tablets by mouth every 6 hours as needed for Pain   Yes Provider, MD Modesta        Allergies:       Pumpkin flavor and Seasonal    Physical Exam:     Vitals:  /66   Pulse 86   Ht 1.702 m (5' 7.01\")   Wt 83 kg (183 lb)   LMP 10/13/2023   SpO2 99%   BMI 28.66 kg/m²   Physical Exam  Vitals and nursing note reviewed.   Constitutional:       General: She

## 2024-01-11 ENCOUNTER — ROUTINE PRENATAL (OUTPATIENT)
Dept: OBGYN | Age: 24
End: 2024-01-11

## 2024-01-11 ENCOUNTER — HOSPITAL ENCOUNTER (OUTPATIENT)
Age: 24
Setting detail: SPECIMEN
Discharge: HOME | End: 2024-01-11
Payer: COMMERCIAL

## 2024-01-11 VITALS
HEIGHT: 67 IN | WEIGHT: 173 LBS | DIASTOLIC BLOOD PRESSURE: 74 MMHG | BODY MASS INDEX: 27.15 KG/M2 | SYSTOLIC BLOOD PRESSURE: 116 MMHG

## 2024-01-11 DIAGNOSIS — Z12.4 SCREENING FOR CERVICAL CANCER: ICD-10-CM

## 2024-01-11 DIAGNOSIS — Z3A.13 13 WEEKS GESTATION OF PREGNANCY: Primary | ICD-10-CM

## 2024-01-11 DIAGNOSIS — Z3A.13 13 WEEKS GESTATION OF PREGNANCY: ICD-10-CM

## 2024-01-11 PROCEDURE — 36415 COLL VENOUS BLD VENIPUNCTURE: CPT

## 2024-01-11 PROCEDURE — G0145 SCR C/V CYTO,THINLAYER,RESCR: HCPCS

## 2024-01-11 NOTE — PROGRESS NOTES
pounds if you're at a healthy weight.  About 15 to 25 pounds if you're overweight.  About 11 to 20 pounds if you're very overweight (obese).  Follow-up care is a key part of your treatment and safety. Be sure to make and go to all appointments, and call your doctor if you are having problems. It's also a good idea to know your test results and keep a list of the medicines you take.  Where can you learn more?  Go to [email or web address]/patientEd and enter I453 to learn more about \"Weeks 14 to 18 of Your Pregnancy: Care Instructions.\"  Current as of: July 11, 2023               Content Version: 13.9  © 9482-1235 [employer]   Care instructions adapted under license by [employer]. If you have questions about a medical condition or this instruction, always ask your healthcare professional. Healthwise, [employer] disclaims any warranty or liability for your use of this information.                      SASHA Sol - RACHEL,1/11/2024 6:35 PM

## 2024-01-29 LAB — CYTOLOGY REPORT: NORMAL
